# Patient Record
Sex: FEMALE | Race: WHITE | Employment: STUDENT | URBAN - METROPOLITAN AREA
[De-identification: names, ages, dates, MRNs, and addresses within clinical notes are randomized per-mention and may not be internally consistent; named-entity substitution may affect disease eponyms.]

---

## 2023-05-18 ENCOUNTER — HOSPITAL ENCOUNTER (EMERGENCY)
Age: 43
Discharge: HOME | End: 2023-05-19
Attending: EMERGENCY MEDICINE

## 2023-05-18 ENCOUNTER — APPOINTMENT (OUTPATIENT)
Dept: CT IMAGING | Age: 43
End: 2023-05-18
Attending: EMERGENCY MEDICINE

## 2023-05-18 ENCOUNTER — APPOINTMENT (OUTPATIENT)
Dept: GENERAL RADIOLOGY | Age: 43
End: 2023-05-18
Attending: EMERGENCY MEDICINE

## 2023-05-18 DIAGNOSIS — R10.9 ABDOMINAL PAIN, RIGHT LATERAL: ICD-10-CM

## 2023-05-18 DIAGNOSIS — N39.0 URINARY TRACT INFECTION WITHOUT HEMATURIA, SITE UNSPECIFIED: Primary | ICD-10-CM

## 2023-05-18 LAB
ALBUMIN SERPL-MCNC: 4.5 G/DL (ref 3.4–5)
ALBUMIN/GLOB SERPL: 1.3 {RATIO} (ref 1–2)
ALP LIVER SERPL-CCNC: 129 U/L
ALT SERPL-CCNC: 42 U/L
ANION GAP SERPL CALC-SCNC: 6 MMOL/L (ref 0–18)
AST SERPL-CCNC: 27 U/L (ref 15–37)
B-HCG UR QL: NEGATIVE
BASOPHILS # BLD AUTO: 0.04 X10(3) UL (ref 0–0.2)
BASOPHILS NFR BLD AUTO: 0.4 %
BILIRUB SERPL-MCNC: 0.4 MG/DL (ref 0.1–2)
BILIRUB UR QL STRIP.AUTO: NEGATIVE
BUN BLD-MCNC: 17 MG/DL (ref 7–18)
BUN BLD-MCNC: 17 MG/DL (ref 7–18)
CALCIUM BLD-MCNC: 10.6 MG/DL (ref 8.5–10.1)
CHLORIDE BLD-SCNC: 105 MMOL/L (ref 98–112)
CHLORIDE SERPL-SCNC: 106 MMOL/L (ref 98–112)
CLARITY UR REFRACT.AUTO: CLEAR
CO2 BLD-SCNC: 28 MMOL/L (ref 21–32)
CO2 SERPL-SCNC: 25 MMOL/L (ref 21–32)
COLOR UR AUTO: YELLOW
CREAT BLD-MCNC: 1.2 MG/DL
CREAT BLD-MCNC: 1.3 MG/DL
EOSINOPHIL # BLD AUTO: 0.1 X10(3) UL (ref 0–0.7)
EOSINOPHIL NFR BLD AUTO: 1 %
ERYTHROCYTE [DISTWIDTH] IN BLOOD BY AUTOMATED COUNT: 13.4 %
GFR SERPLBLD BASED ON 1.73 SQ M-ARVRAT: 52 ML/MIN/1.73M2 (ref 60–?)
GFR SERPLBLD BASED ON 1.73 SQ M-ARVRAT: 58 ML/MIN/1.73M2 (ref 60–?)
GLOBULIN PLAS-MCNC: 3.5 G/DL (ref 2.8–4.4)
GLUCOSE BLD-MCNC: 96 MG/DL (ref 70–99)
GLUCOSE BLD-MCNC: 96 MG/DL (ref 70–99)
GLUCOSE UR STRIP.AUTO-MCNC: NEGATIVE MG/DL
HCT VFR BLD AUTO: 43.5 %
HCT VFR BLD CALC: 43 %
HGB BLD-MCNC: 14.5 G/DL
IMM GRANULOCYTES # BLD AUTO: 0.07 X10(3) UL (ref 0–1)
IMM GRANULOCYTES NFR BLD: 0.7 %
ISTAT IONIZED CALCIUM FOR CHEM 8: 1.36 MMOL/L (ref 1.12–1.32)
KETONES UR STRIP.AUTO-MCNC: NEGATIVE MG/DL
LIPASE SERPL-CCNC: 32 U/L (ref 13–75)
LYMPHOCYTES # BLD AUTO: 4.05 X10(3) UL (ref 1–4)
LYMPHOCYTES NFR BLD AUTO: 40.1 %
MCH RBC QN AUTO: 28.7 PG (ref 26–34)
MCHC RBC AUTO-ENTMCNC: 33.3 G/DL (ref 31–37)
MCV RBC AUTO: 86.1 FL
MONOCYTES # BLD AUTO: 0.81 X10(3) UL (ref 0.1–1)
MONOCYTES NFR BLD AUTO: 8 %
NEUTROPHILS # BLD AUTO: 5.04 X10 (3) UL (ref 1.5–7.7)
NEUTROPHILS # BLD AUTO: 5.04 X10(3) UL (ref 1.5–7.7)
NEUTROPHILS NFR BLD AUTO: 49.8 %
NITRITE UR QL STRIP.AUTO: NEGATIVE
OSMOLALITY SERPL CALC.SUM OF ELEC: 285 MOSM/KG (ref 275–295)
PH UR STRIP.AUTO: 7 [PH] (ref 5–8)
PLATELET # BLD AUTO: 349 10(3)UL (ref 150–450)
POTASSIUM BLD-SCNC: 3.9 MMOL/L (ref 3.6–5.1)
POTASSIUM SERPL-SCNC: 3.7 MMOL/L (ref 3.5–5.1)
PROT SERPL-MCNC: 8 G/DL (ref 6.4–8.2)
PROT UR STRIP.AUTO-MCNC: NEGATIVE MG/DL
RBC # BLD AUTO: 5.05 X10(6)UL
SODIUM BLD-SCNC: 141 MMOL/L (ref 136–145)
SODIUM SERPL-SCNC: 137 MMOL/L (ref 136–145)
SP GR UR STRIP.AUTO: 1.01 (ref 1–1.03)
TROPONIN I HIGH SENSITIVITY: 6 NG/L
TROPONIN I HIGH SENSITIVITY: 7 NG/L
UROBILINOGEN UR STRIP.AUTO-MCNC: 0.2 MG/DL
WBC # BLD AUTO: 10.1 X10(3) UL (ref 4–11)

## 2023-05-18 PROCEDURE — 96376 TX/PRO/DX INJ SAME DRUG ADON: CPT

## 2023-05-18 PROCEDURE — 81015 MICROSCOPIC EXAM OF URINE: CPT

## 2023-05-18 PROCEDURE — 87086 URINE CULTURE/COLONY COUNT: CPT | Performed by: EMERGENCY MEDICINE

## 2023-05-18 PROCEDURE — 80047 BASIC METABLC PNL IONIZED CA: CPT

## 2023-05-18 PROCEDURE — 81001 URINALYSIS AUTO W/SCOPE: CPT

## 2023-05-18 PROCEDURE — 81025 URINE PREGNANCY TEST: CPT

## 2023-05-18 PROCEDURE — 85025 COMPLETE CBC W/AUTO DIFF WBC: CPT

## 2023-05-18 PROCEDURE — 96361 HYDRATE IV INFUSION ADD-ON: CPT

## 2023-05-18 PROCEDURE — 96365 THER/PROPH/DIAG IV INF INIT: CPT

## 2023-05-18 PROCEDURE — 71045 X-RAY EXAM CHEST 1 VIEW: CPT | Performed by: EMERGENCY MEDICINE

## 2023-05-18 PROCEDURE — 80053 COMPREHEN METABOLIC PANEL: CPT | Performed by: EMERGENCY MEDICINE

## 2023-05-18 PROCEDURE — 93010 ELECTROCARDIOGRAM REPORT: CPT

## 2023-05-18 PROCEDURE — 99285 EMERGENCY DEPT VISIT HI MDM: CPT

## 2023-05-18 PROCEDURE — 96375 TX/PRO/DX INJ NEW DRUG ADDON: CPT

## 2023-05-18 PROCEDURE — 84484 ASSAY OF TROPONIN QUANT: CPT | Performed by: EMERGENCY MEDICINE

## 2023-05-18 PROCEDURE — 74177 CT ABD & PELVIS W/CONTRAST: CPT | Performed by: EMERGENCY MEDICINE

## 2023-05-18 PROCEDURE — 87086 URINE CULTURE/COLONY COUNT: CPT

## 2023-05-18 PROCEDURE — 83690 ASSAY OF LIPASE: CPT | Performed by: EMERGENCY MEDICINE

## 2023-05-18 PROCEDURE — 93005 ELECTROCARDIOGRAM TRACING: CPT

## 2023-05-18 PROCEDURE — 81015 MICROSCOPIC EXAM OF URINE: CPT | Performed by: EMERGENCY MEDICINE

## 2023-05-18 PROCEDURE — 85025 COMPLETE CBC W/AUTO DIFF WBC: CPT | Performed by: EMERGENCY MEDICINE

## 2023-05-18 RX ORDER — MORPHINE SULFATE 4 MG/ML
4 INJECTION, SOLUTION INTRAMUSCULAR; INTRAVENOUS ONCE
Status: COMPLETED | OUTPATIENT
Start: 2023-05-18 | End: 2023-05-18

## 2023-05-18 RX ORDER — ONDANSETRON 2 MG/ML
4 INJECTION INTRAMUSCULAR; INTRAVENOUS ONCE
Status: COMPLETED | OUTPATIENT
Start: 2023-05-18 | End: 2023-05-18

## 2023-05-18 RX ORDER — ONDANSETRON 2 MG/ML
4 INJECTION INTRAMUSCULAR; INTRAVENOUS ONCE
Status: DISCONTINUED | OUTPATIENT
Start: 2023-05-18 | End: 2023-05-18

## 2023-05-19 VITALS
WEIGHT: 190 LBS | SYSTOLIC BLOOD PRESSURE: 160 MMHG | BODY MASS INDEX: 31.65 KG/M2 | TEMPERATURE: 98 F | RESPIRATION RATE: 16 BRPM | HEIGHT: 65 IN | OXYGEN SATURATION: 96 % | HEART RATE: 71 BPM | DIASTOLIC BLOOD PRESSURE: 96 MMHG

## 2023-05-19 LAB
ATRIAL RATE: 67 BPM
P AXIS: 38 DEGREES
P-R INTERVAL: 118 MS
Q-T INTERVAL: 442 MS
QRS DURATION: 82 MS
QTC CALCULATION (BEZET): 467 MS
R AXIS: 27 DEGREES
T AXIS: 56 DEGREES
VENTRICULAR RATE: 67 BPM

## 2023-05-19 PROCEDURE — 96375 TX/PRO/DX INJ NEW DRUG ADDON: CPT

## 2023-05-19 RX ORDER — CEPHALEXIN 500 MG/1
500 CAPSULE ORAL 3 TIMES DAILY
Qty: 21 CAPSULE | Refills: 0 | Status: SHIPPED | OUTPATIENT
Start: 2023-05-19 | End: 2023-05-26

## 2023-05-19 RX ORDER — ONDANSETRON 4 MG/1
4 TABLET, ORALLY DISINTEGRATING ORAL EVERY 4 HOURS PRN
Qty: 10 TABLET | Refills: 0 | Status: SHIPPED | OUTPATIENT
Start: 2023-05-19 | End: 2023-05-26

## 2023-05-19 RX ORDER — KETOROLAC TROMETHAMINE 15 MG/ML
15 INJECTION, SOLUTION INTRAMUSCULAR; INTRAVENOUS ONCE
Status: COMPLETED | OUTPATIENT
Start: 2023-05-19 | End: 2023-05-19

## 2023-05-19 NOTE — ED INITIAL ASSESSMENT (HPI)
PT to the ED for evaluation of RLQ abdominal pain for the last couple of days. RUQ abdominal pain and n/v that began today.

## 2023-05-19 NOTE — DISCHARGE INSTRUCTIONS
Please follow-up with your primary care doctor within the next week for reassessment. Please have them recheck your blood pressure at the follow-up visit. Take the medication as prescribed. Return for worsening pain, fevers or any other concerning symptoms.

## 2023-12-04 ENCOUNTER — APPOINTMENT (OUTPATIENT)
Dept: GENERAL RADIOLOGY | Age: 43
End: 2023-12-04
Attending: PHYSICIAN ASSISTANT

## 2023-12-04 ENCOUNTER — HOSPITAL ENCOUNTER (EMERGENCY)
Age: 43
Discharge: HOME | End: 2023-12-04

## 2023-12-04 VITALS
TEMPERATURE: 100 F | OXYGEN SATURATION: 96 % | HEART RATE: 99 BPM | RESPIRATION RATE: 20 BRPM | SYSTOLIC BLOOD PRESSURE: 149 MMHG | BODY MASS INDEX: 31.82 KG/M2 | WEIGHT: 191 LBS | HEIGHT: 65 IN | DIASTOLIC BLOOD PRESSURE: 87 MMHG

## 2023-12-04 DIAGNOSIS — J40 BRONCHITIS DUE TO 2019 NOVEL CORONAVIRUS: Primary | ICD-10-CM

## 2023-12-04 DIAGNOSIS — U07.1 BRONCHITIS DUE TO 2019 NOVEL CORONAVIRUS: Primary | ICD-10-CM

## 2023-12-04 PROCEDURE — 99283 EMERGENCY DEPT VISIT LOW MDM: CPT

## 2023-12-04 PROCEDURE — 99284 EMERGENCY DEPT VISIT MOD MDM: CPT

## 2023-12-04 PROCEDURE — 71046 X-RAY EXAM CHEST 2 VIEWS: CPT | Performed by: PHYSICIAN ASSISTANT

## 2023-12-04 RX ORDER — BENZONATATE 100 MG/1
100 CAPSULE ORAL 3 TIMES DAILY PRN
Qty: 30 CAPSULE | Refills: 0 | Status: SHIPPED | OUTPATIENT
Start: 2023-12-04 | End: 2024-01-03

## 2023-12-04 RX ORDER — ALBUTEROL SULFATE 90 UG/1
2 AEROSOL, METERED RESPIRATORY (INHALATION) EVERY 4 HOURS PRN
Qty: 1 EACH | Refills: 0 | Status: SHIPPED | OUTPATIENT
Start: 2023-12-04 | End: 2024-01-03

## 2023-12-04 RX ORDER — PSEUDOEPHEDRINE HCL 30 MG
30 TABLET ORAL EVERY 4 HOURS PRN
Qty: 36 TABLET | Refills: 0 | Status: SHIPPED | OUTPATIENT
Start: 2023-12-04 | End: 2024-01-03

## 2023-12-04 RX ORDER — DEXAMETHASONE 4 MG/1
8 TABLET ORAL
Qty: 6 TABLET | Refills: 0 | Status: SHIPPED | OUTPATIENT
Start: 2023-12-04 | End: 2023-12-11

## 2023-12-04 RX ORDER — IBUPROFEN 600 MG/1
600 TABLET ORAL EVERY 8 HOURS PRN
Qty: 21 TABLET | Refills: 0 | Status: SHIPPED | OUTPATIENT
Start: 2023-12-04 | End: 2023-12-11

## 2023-12-04 RX ORDER — CODEINE PHOSPHATE AND GUAIFENESIN 10; 100 MG/5ML; MG/5ML
SOLUTION ORAL
COMMUNITY
Start: 2023-12-01

## 2023-12-04 RX ORDER — AMLODIPINE BESYLATE 5 MG/1
TABLET ORAL AS DIRECTED
COMMUNITY

## 2023-12-04 RX ORDER — OXYMETAZOLINE HYDROCHLORIDE 0.05 G/100ML
1 SPRAY NASAL EVERY 4 HOURS PRN
Qty: 15 ML | Refills: 0 | Status: SHIPPED | OUTPATIENT
Start: 2023-12-04 | End: 2024-01-03

## 2023-12-04 NOTE — ED INITIAL ASSESSMENT (HPI)
Patient presents with sinus congestion, sore throat, fevers, and headache.    Patient reports she is covid+ and SARS+

## 2024-03-13 ENCOUNTER — APPOINTMENT (OUTPATIENT)
Dept: CT IMAGING | Age: 44
End: 2024-03-13
Attending: EMERGENCY MEDICINE

## 2024-03-13 ENCOUNTER — HOSPITAL ENCOUNTER (EMERGENCY)
Age: 44
Discharge: HOME | End: 2024-03-13
Attending: EMERGENCY MEDICINE

## 2024-03-13 VITALS
TEMPERATURE: 98 F | RESPIRATION RATE: 18 BRPM | DIASTOLIC BLOOD PRESSURE: 99 MMHG | WEIGHT: 180 LBS | SYSTOLIC BLOOD PRESSURE: 132 MMHG | HEART RATE: 78 BPM | BODY MASS INDEX: 30.73 KG/M2 | HEIGHT: 64 IN | OXYGEN SATURATION: 96 %

## 2024-03-13 DIAGNOSIS — K59.00 CONSTIPATION, UNSPECIFIED CONSTIPATION TYPE: ICD-10-CM

## 2024-03-13 DIAGNOSIS — R31.9 URINARY TRACT INFECTION WITH HEMATURIA, SITE UNSPECIFIED: Primary | ICD-10-CM

## 2024-03-13 DIAGNOSIS — R74.8 ELEVATED LIVER ENZYMES: ICD-10-CM

## 2024-03-13 DIAGNOSIS — N39.0 URINARY TRACT INFECTION WITH HEMATURIA, SITE UNSPECIFIED: Primary | ICD-10-CM

## 2024-03-13 LAB
ALBUMIN SERPL-MCNC: 4.4 G/DL (ref 3.4–5)
ALBUMIN/GLOB SERPL: 1.1 {RATIO} (ref 1–2)
ALP LIVER SERPL-CCNC: 176 U/L
ALT SERPL-CCNC: 186 U/L
ANION GAP SERPL CALC-SCNC: 6 MMOL/L (ref 0–18)
AST SERPL-CCNC: 81 U/L (ref 15–37)
B-HCG UR QL: NEGATIVE
BASOPHILS # BLD AUTO: 0.07 X10(3) UL (ref 0–0.2)
BASOPHILS NFR BLD AUTO: 0.8 %
BILIRUB SERPL-MCNC: 0.4 MG/DL (ref 0.1–2)
BUN BLD-MCNC: 14 MG/DL (ref 9–23)
CALCIUM BLD-MCNC: 9 MG/DL (ref 8.5–10.1)
CHLORIDE SERPL-SCNC: 109 MMOL/L (ref 98–112)
CLARITY UR REFRACT.AUTO: CLEAR
CO2 SERPL-SCNC: 23 MMOL/L (ref 21–32)
COLOR UR AUTO: YELLOW
CREAT BLD-MCNC: 1.15 MG/DL
EGFRCR SERPLBLD CKD-EPI 2021: 60 ML/MIN/1.73M2 (ref 60–?)
EOSINOPHIL # BLD AUTO: 0.13 X10(3) UL (ref 0–0.7)
EOSINOPHIL NFR BLD AUTO: 1.4 %
ERYTHROCYTE [DISTWIDTH] IN BLOOD BY AUTOMATED COUNT: 13.5 %
GLOBULIN PLAS-MCNC: 4.1 G/DL (ref 2.8–4.4)
GLUCOSE BLD-MCNC: 105 MG/DL (ref 70–99)
GLUCOSE UR STRIP.AUTO-MCNC: NEGATIVE MG/DL
HCT VFR BLD AUTO: 45.2 %
HGB BLD-MCNC: 15.1 G/DL
HYALINE CASTS #/AREA URNS AUTO: PRESENT /LPF
IMM GRANULOCYTES # BLD AUTO: 0.04 X10(3) UL (ref 0–1)
IMM GRANULOCYTES NFR BLD: 0.4 %
KETONES UR STRIP.AUTO-MCNC: NEGATIVE MG/DL
LYMPHOCYTES # BLD AUTO: 3.28 X10(3) UL (ref 1–4)
LYMPHOCYTES NFR BLD AUTO: 35.7 %
MCH RBC QN AUTO: 27.5 PG (ref 26–34)
MCHC RBC AUTO-ENTMCNC: 33.4 G/DL (ref 31–37)
MCV RBC AUTO: 82.3 FL
MONOCYTES # BLD AUTO: 0.53 X10(3) UL (ref 0.1–1)
MONOCYTES NFR BLD AUTO: 5.8 %
NEUTROPHILS # BLD AUTO: 5.15 X10 (3) UL (ref 1.5–7.7)
NEUTROPHILS # BLD AUTO: 5.15 X10(3) UL (ref 1.5–7.7)
NEUTROPHILS NFR BLD AUTO: 55.9 %
NITRITE UR QL STRIP.AUTO: NEGATIVE
OSMOLALITY SERPL CALC.SUM OF ELEC: 287 MOSM/KG (ref 275–295)
PH UR STRIP.AUTO: 6 [PH] (ref 5–8)
PLATELET # BLD AUTO: 410 10(3)UL (ref 150–450)
POTASSIUM SERPL-SCNC: 3.5 MMOL/L (ref 3.5–5.1)
PROT SERPL-MCNC: 8.5 G/DL (ref 6.4–8.2)
RBC # BLD AUTO: 5.49 X10(6)UL
RBC UR QL AUTO: NEGATIVE
SODIUM SERPL-SCNC: 138 MMOL/L (ref 136–145)
SP GR UR STRIP.AUTO: 1.02 (ref 1–1.03)
UROBILINOGEN UR STRIP.AUTO-MCNC: 0.2 MG/DL
WBC # BLD AUTO: 9.2 X10(3) UL (ref 4–11)

## 2024-03-13 PROCEDURE — 87086 URINE CULTURE/COLONY COUNT: CPT | Performed by: EMERGENCY MEDICINE

## 2024-03-13 PROCEDURE — 81015 MICROSCOPIC EXAM OF URINE: CPT | Performed by: EMERGENCY MEDICINE

## 2024-03-13 PROCEDURE — 80053 COMPREHEN METABOLIC PANEL: CPT | Performed by: EMERGENCY MEDICINE

## 2024-03-13 PROCEDURE — 96375 TX/PRO/DX INJ NEW DRUG ADDON: CPT

## 2024-03-13 PROCEDURE — 81001 URINALYSIS AUTO W/SCOPE: CPT | Performed by: EMERGENCY MEDICINE

## 2024-03-13 PROCEDURE — 81025 URINE PREGNANCY TEST: CPT

## 2024-03-13 PROCEDURE — 96361 HYDRATE IV INFUSION ADD-ON: CPT

## 2024-03-13 PROCEDURE — 74177 CT ABD & PELVIS W/CONTRAST: CPT | Performed by: EMERGENCY MEDICINE

## 2024-03-13 PROCEDURE — 99285 EMERGENCY DEPT VISIT HI MDM: CPT

## 2024-03-13 PROCEDURE — 96374 THER/PROPH/DIAG INJ IV PUSH: CPT

## 2024-03-13 PROCEDURE — 99284 EMERGENCY DEPT VISIT MOD MDM: CPT

## 2024-03-13 PROCEDURE — 85025 COMPLETE CBC W/AUTO DIFF WBC: CPT | Performed by: EMERGENCY MEDICINE

## 2024-03-13 RX ORDER — ONDANSETRON 2 MG/ML
4 INJECTION INTRAMUSCULAR; INTRAVENOUS ONCE
Status: COMPLETED | OUTPATIENT
Start: 2024-03-13 | End: 2024-03-13

## 2024-03-13 RX ORDER — HYDROMORPHONE HYDROCHLORIDE 1 MG/ML
0.5 INJECTION, SOLUTION INTRAMUSCULAR; INTRAVENOUS; SUBCUTANEOUS ONCE
Status: COMPLETED | OUTPATIENT
Start: 2024-03-13 | End: 2024-03-13

## 2024-03-13 RX ORDER — KETOROLAC TROMETHAMINE 15 MG/ML
15 INJECTION, SOLUTION INTRAMUSCULAR; INTRAVENOUS ONCE
Status: COMPLETED | OUTPATIENT
Start: 2024-03-13 | End: 2024-03-13

## 2024-03-13 RX ORDER — CEPHALEXIN 500 MG/1
500 CAPSULE ORAL 2 TIMES DAILY
Qty: 14 CAPSULE | Refills: 0 | Status: SHIPPED | OUTPATIENT
Start: 2024-03-13 | End: 2024-03-20

## 2024-03-14 NOTE — ED PROVIDER NOTES
Patient Seen in: [facility] In [place]      History     Chief Complaint   Patient presents with    Bleeding     Stated Complaint: rectal bleeding    Subjective:   HPI    This is a 43-year-old female who presents emergency room for evaluation of lower abdominal/suprapubic discomfort and rectal bleeding or blood in the urine.  Patient states that this morning she went to the bathroom, states she looked in the toilet and there was blood, states that she is unsure if it came from her rectum or from her urethra, states she did not move her bowels that she has been constipated.  States she does not feel she was straining  hard.  Does have a history of hemorrhoids, denies any rectal pain.  States that she does not have vaginal bleeding, reports she is status post hysterectomy.  Reports she has had urinary frequency and dysuria.  Denies back or flank pain.  States she has crampy discomfort in the lower abdomen.  Admits to nausea but not vomiting.  Denies any fevers or chills.  Denies any melena or hematochezia.    Objective:   Past Medical History:   Diagnosis Date    Anxiety     Attention deficit disorder with hyperactivity(314.01) 2012    Carnitine deficiency (HCC) 2013    Esophageal reflux     Estrogen deficiency 2013    Foot fracture 2014    2014     Generalized weakness 2014    Iron deficiency anemia 2015    Migraine, unspecified, without mention of intractable migraine without mention of status migrainosus 2012    Pain in joint, pelvic region and thigh     Personal history of methicillin resistant Staphylococcus aureus 07    Unspecified essential hypertension 2012              Past Surgical History:   Procedure Laterality Date          , 2005    HYSTERECTOMY  09    OTHER SURGICAL HISTORY  98    Gallbladder     OTHER SURGICAL HISTORY  96    Nasal septal deviation repair    T&A  96                Social History     Socioeconomic  History    Marital status: Unknown   Tobacco Use    Smoking status: Former     Passive exposure: Current   Vaping Use    Vaping Use: Never used   Substance and Sexual Activity    Alcohol use: No    Drug use: Yes     Types: Cannabis   Other Topics Concern    Caffeine Concern Yes     Comment: 0-6 oz daily    Exercise Yes     Comment: Yoga daily, cardio & Wts 7 times/week              Review of Systems    Positive for stated complaint: rectal bleeding  Other systems are as noted in HPI.  Constitutional and vital signs reviewed.      All other systems reviewed and negative except as noted above.    Physical Exam     ED Triage Vitals [03/13/24 1539]   BP (!) 141/100   Pulse 90   Resp 18   Temp 98.4 °F (36.9 °C)   Temp src    SpO2 98 %   O2 Device None (Room air)       Current:BP (!) 132/99   Pulse 78   Temp 98.4 °F (36.9 °C)   Resp 18   Ht 162.6 cm (5' 4\")   Wt 81.6 kg   SpO2 96%   BMI 30.90 kg/m²         Physical Exam    GENERAL: Patient is awake, alert, well-appearing, in no acute distress.  HEENT:  no scleral icterus.  Mucous membranes are moist  HEART: Regular rate and rhythm, no murmurs.  LUNGS: Clear to auscultation bilaterally.  No Rales, no rhonchi, no wheezing, no stridor.  ABDOMEN: Soft, nondistended, suprapubic tender, bowel sounds are present, no rebound, no rigidity, no guarding.no pulsatile masses. No CVA tenderness  Rectal exam: Performed with JAMIE Fagan as chaperone-nonthrombosed external hemorrhoids, there is no gross blood per rectum there is no stool in rectal vault.  EXTREMITIES: No peripheral edema, no calf tenderness,      ED Course     Labs Reviewed   COMP METABOLIC PANEL (14) - Abnormal; Notable for the following components:       Result Value    Glucose 105 (*)     Creatinine 1.15 (*)     AST 81 (*)      (*)     Alkaline Phosphatase 176 (*)     Total Protein 8.5 (*)     All other components within normal limits   URINALYSIS WITH CULTURE REFLEX - Abnormal; Notable for the following  components:    Bilirubin Urine Small (*)     Protein Urine 30 mg/dL (*)     Leukocyte Esterase Urine Small (*)     All other components within normal limits   UA MICROSCOPIC ONLY, URINE - Abnormal; Notable for the following components:    WBC Urine 21-50 (*)     Bacteria Urine 1+ (*)     Squamous Epi. Cells Many (*)     Hyaline Casts Present (*)     All other components within normal limits   CBC W/ DIFFERENTIAL - Abnormal; Notable for the following components:    RBC 5.49 (*)     All other components within normal limits   POCT PREGNANCY URINE - Normal   CBC WITH DIFFERENTIAL WITH PLATELET    Narrative:     The following orders were created for panel order CBC With Differential With Platelet.  Procedure                               Abnormality         Status                     ---------                               -----------         ------                     CBC W/ DIFFERENTIAL[[id number]]          Abnormal            Final result                 Please view results for these tests on the individual orders.   URINE CULTURE, ROUTINE                      MDM        Differential diagnosis before testing includes but not limited to cystitis/urinary tract infection, nephrolithiasis/urolithiasis, colitis, electrolyte abnormality, anemia, perforated viscus, bowel obstruction, which is a medical condition that poses a threat to life/function    Past Medical History/comorbidities-status post total hysterectomy    Radiographic images  I personally reviewed the radiographs and my individual interpretation shows CT abdomen no free air  I also reviewed the official reports that showed CT abdomen no acute abnormality, moderate amount of fecal material throughout colon may be seen with constipation      Medications Provided: IV normal saline, Toradol, Dilaudid    Course of Events during Emergency Room Visit include IV established blood work obtained.  Patient given IV fluid hydration and pain medication.  CBC was  unremarkable.  Chemistry sodium 130 potassium 3.5 BUN 14 creatinine 1.15 glucose 105.  AST/ALT/alk phos were elevated.  CT abdomen without acute findings.  Urinalysis consistent with UTI.  On reevaluation states she is feel much better abdomen soft nontender nonsurgical, vital signs are stable.  Discussed with patient all results, patient does have blood in the urine which appears to be the source of her symptoms, will prescribe antibiotic, does have elevated liver enzymes of unclear etiology.  Recommend follow-up with primary care as well as GI.  Return to ER if any change or symptoms.  Patient well-appearing agrees plan discharge good condition with mother    Shared decision making was utilized           Disposition:      Discharge  I have discussed with the patient the results of test, differential diagnosis, treatment plan, warning signs and symptoms which should prompt immediate return.  They expressed understanding of these instructions and agrees to the following plan provided.  They were given written discharge instructions and agrees to return for any concerns and voiced understanding and all questions were answered.    Note to patient: The 21st Century Cures Act makes medical notes like these available to patients in the interest of transparency. However, this is a medical document intended as peer to peer communication. It is written in medical language and may contain abbreviations or verbiage that are unfamiliar. It may appear blunt or direct. Medical documents are intended to carry relevant information, facts as evident, and the clinical opinion of the practitioner.                                            Medical Decision Making      Disposition and Plan     Clinical Impression:  1. Urinary tract infection with hematuria, site unspecified    2. Elevated liver enzymes    3. Constipation, unspecified constipation type         Disposition:  Discharge  3/13/2024  6:46 pm    Follow-up:  [place]  [place]  [address]  [address]  [facility] [address]  [phone]    Follow up in 2 day(s)            Medications Prescribed:  Discharge Medication List as of 3/13/2024  6:52 PM        START taking these medications    Details   cephalexin 500 MG Oral Cap Take 1 capsule (500 mg total) by mouth 2 (two) times daily for 7 days., Normal, Disp-14 capsule, R-0

## 2024-06-18 ENCOUNTER — HOSPITAL ENCOUNTER (EMERGENCY)
Age: 44
Discharge: HOME | End: 2024-06-18
Attending: EMERGENCY MEDICINE

## 2024-06-18 ENCOUNTER — APPOINTMENT (OUTPATIENT)
Dept: CT IMAGING | Age: 44
End: 2024-06-18
Attending: EMERGENCY MEDICINE

## 2024-06-18 ENCOUNTER — ORDER TRANSCRIPTION (OUTPATIENT)
Dept: EMERGENCY DEPT | Age: 44
End: 2024-06-18

## 2024-06-18 VITALS
WEIGHT: 180 LBS | HEIGHT: 64 IN | OXYGEN SATURATION: 94 % | TEMPERATURE: 99 F | RESPIRATION RATE: 18 BRPM | HEART RATE: 93 BPM | BODY MASS INDEX: 30.73 KG/M2 | DIASTOLIC BLOOD PRESSURE: 103 MMHG | SYSTOLIC BLOOD PRESSURE: 164 MMHG

## 2024-06-18 DIAGNOSIS — S09.90XA CLOSED HEAD INJURY, INITIAL ENCOUNTER: Primary | ICD-10-CM

## 2024-06-18 DIAGNOSIS — S06.0X1A CONCUSSION WITH LOSS OF CONSCIOUSNESS OF 30 MINUTES OR LESS, INITIAL ENCOUNTER: ICD-10-CM

## 2024-06-18 PROCEDURE — 70450 CT HEAD/BRAIN W/O DYE: CPT | Performed by: EMERGENCY MEDICINE

## 2024-06-18 PROCEDURE — 99284 EMERGENCY DEPT VISIT MOD MDM: CPT

## 2024-06-18 PROCEDURE — 96375 TX/PRO/DX INJ NEW DRUG ADDON: CPT

## 2024-06-18 PROCEDURE — 96374 THER/PROPH/DIAG INJ IV PUSH: CPT

## 2024-06-18 RX ORDER — DIPHENHYDRAMINE HYDROCHLORIDE 50 MG/ML
50 INJECTION INTRAMUSCULAR; INTRAVENOUS ONCE
Status: COMPLETED | OUTPATIENT
Start: 2024-06-18 | End: 2024-06-18

## 2024-06-18 RX ORDER — METOCLOPRAMIDE HYDROCHLORIDE 5 MG/ML
10 INJECTION INTRAMUSCULAR; INTRAVENOUS ONCE
Status: COMPLETED | OUTPATIENT
Start: 2024-06-18 | End: 2024-06-18

## 2024-06-18 RX ORDER — BUTALBITAL, ACETAMINOPHEN AND CAFFEINE 50; 325; 40 MG/1; MG/1; MG/1
1-2 TABLET ORAL EVERY 6 HOURS PRN
Qty: 10 TABLET | Refills: 0 | Status: SHIPPED | OUTPATIENT
Start: 2024-06-18 | End: 2024-06-23

## 2024-06-18 NOTE — ED PROVIDER NOTES
Patient Seen in: [facility] In [place]      History     Chief Complaint   Patient presents with    Head Injury     Pt states that last Thursday she fell backwards and hit her head while at work. Pt states that she never f/u afterwards. Pt states that she continues with a headache and difficulty remembering after the incident.      Stated Complaint: Pt states she fell backwards last thursday and hit her head. Pt states that she*    Subjective:   HPI    43-year-old female stated that she fell backwards after a chair slipped at work at [employer] 5 days ago and hit the back of her head.  Since that time she states she has had a persistent headache, currently 4/10 in severity, and feels somewhat confused.  She attempted to work today but left after a few hours.  She states she has had some intermittent double vision.  No nausea or vomiting, neck pain, speech difficulty, unilateral numbness or weakness.  Her mother with whom she resides states that she was confused today and did not know what day it was.  Patient states she has been taking over-the-counter analgesics without sustained symptomatic improvement.    Objective:   Past Medical History:    Anxiety    Attention deficit disorder with hyperactivity(314.01)    Carnitine deficiency (HCC)    Esophageal reflux    Estrogen deficiency    Foot fracture         Generalized weakness    Iron deficiency anemia    Migraine, unspecified, without mention of intractable migraine without mention of status migrainosus    Migraines    Pain in joint, pelvic region and thigh    Personal history of methicillin resistant Staphylococcus aureus    Unspecified essential hypertension              Past Surgical History:   Procedure Laterality Date          , 2005    Hysterectomy  2009    Other surgical history  1998    Gallbladder     Other surgical history  1996    Nasal septal deviation repair    Plastic surgery - external N/A 2023     \"mommy makeover\"    T&a  01/01/1996                Social History     Socioeconomic History    Marital status: Unknown   Tobacco Use    Smoking status: Former     Passive exposure: Current   Vaping Use    Vaping status: Some Days   Substance and Sexual Activity    Alcohol use: No    Drug use: Yes     Types: Cannabis   Other Topics Concern    Caffeine Concern Yes     Comment: 0-6 oz daily    Exercise Yes     Comment: Yoga daily, cardio & Wts 7 times/week              Review of Systems    Positive for stated complaint: Pt states she fell backwards last thursday and hit her head. Pt states that she*  Other systems are as noted in HPI.  Constitutional and vital signs reviewed.      All other systems reviewed and negative except as noted above.    Physical Exam     ED Triage Vitals [06/18/24 1721]   BP (!) 164/103   Pulse 93   Resp 18   Temp 98.6 °F (37 °C)   Temp src Oral   SpO2 94 %   O2 Device None (Room air)       Current Vitals:   Vital Signs  BP: (!) 164/103  Pulse: 93  Resp: 18  Temp: 98.6 °F (37 °C)  Temp src: Oral    Oxygen Therapy  SpO2: 94 %  O2 Device: None (Room air)            Physical Exam    General appearance: This is a middle-aged female sitting on a gurney.  Vital signs were reviewed per nurses notes.  HEENT: Normocephalic atraumatic.  Pupils equal round reactive to light.  Extraocular movements are intact.  Tympanic membranes are normal.  Nose and oropharynx are atraumatic.  Neck is nontender without adenopathy or thyromegaly.  Lungs are clear to auscultation.  Heart exam: Normal S1-S2 without extra sounds or murmurs.  Regular rate and rhythm.  Abdomen is nontender.  Extremities are atraumatic.  Skin is dry without rashes or lesions.  Neuroexam: Alert and oriented x 4.  Speech is fluent.  Motor strength is 5/5 and symmetrical in all 4 extremities.  Cerebellar exam including finger-nose and heel shin was normal.    ED Course   Labs Reviewed - No data to display          At the patient's request and  intravenous access was obtained and the patient was treated with Reglan and Benadryl for her headache.    Headache was gone upon reevaluation.  Patient states that Imitrex is no longer effective for her migraines.  Prescription for Fioricet was dispensed after discussion regarding test results and treatment plan.    CT BRAIN OR HEAD (71611)    Result Date: 6/18/2024  PROCEDURE:  CT BRAIN OR HEAD (90179)  COMPARISON:  [facility], CT, BRAIN W/O CONTRAST, 7/02/2010, 1:11 AM.  [facility], CT, BRAIN W/O CONTRAST, 3/15/2010, 8:39 PM.  INDICATIONS:  Pt states she fell backwards last thursday and hit her head. Pt states that she never f/u after the fall. Pt states that she continues with headaches and diffic  TECHNIQUE:  Noncontrast CT scanning is performed through the brain. Dose reduction techniques were used. Dose information is transmitted to the ACR (American College of Radiology) NRDR (National Radiology Data Registry) which includes the Dose Index Registry.  PATIENT STATED HISTORY: (As transcribed by Technologist)  Patient states she fell backwards last thursday and hit her head. Pt states that she never f/u after the fall. Pt states that she continues with headaches and difficulty remembering after the incident with pain to back of her head.    FINDINGS:  The ventricles are normal in size and configuration. There is no evidence of hemorrhage, mass, midline shift, or extra-axial fluid collection.  The visualized paranasal sinuses show no significant sinus disease. . No evidence of depressed skull fracture.            CONCLUSION: No acute intracranial findings.    LOCATION:  [facility]   Dictated by (CST): Cm Perla MD on 6/18/2024 at 6:26 PM     Finalized by (CST): Cm Perla MD on 6/18/2024 at 6:27 PM       I personally reviewed the images myself and went over results with patient.  I viewed the CT plane brain films myself and there is no evidence of an acute intracranial abnormality.         MDM      #1.   Closed head injury.  Post fall several days ago with no evidence of intracranial hemorrhage.  2.  Concussion secondary to #1.  Fioricet prescribed.  Taken off of work until she can [facility].                                   Medical Decision Making      Disposition and Plan     Clinical Impression:  1. Closed head injury, initial encounter    2. Concussion with loss of consciousness of 30 minutes or less, initial encounter         Disposition:  Discharge  6/18/2024  6:38 pm    Follow-up:  [facility]  [address] [place] [address] Dr [place] [address] [place] [id number] [phone]  Call in 1 day  Work related injury follow up          Medications Prescribed:  Current Discharge Medication List        START taking these medications    Details   butalbital-acetaminophen-caffeine -40 MG Oral Tab Take 1-2 tablets by mouth every 6 (six) hours as needed for Pain.  Qty: 10 tablet, Refills: 0    Associated Diagnoses: Concussion with loss of consciousness of 30 minutes or less, initial encounter

## 2024-06-18 NOTE — DISCHARGE INSTRUCTIONS
[facility] [place]  [address] [address]  Dept: [phone]  Dept Fax: [phone]     Occupational restrictions  For: Naresh Aleman  No work until follow-up with [facility] tomorrow, Wednesday, June 19, 2024    Limit electronics use including no video games, texting, TV or computer.

## 2024-06-18 NOTE — ED INITIAL ASSESSMENT (HPI)
Pt here for a fall that occurred at work last Thursday, patient went to sit in stool with arm full of supplies and fell. Pt continues to have posterior head pain and is having difficulty recalling full course of events.

## 2024-07-11 ENCOUNTER — HOSPITAL ENCOUNTER (EMERGENCY)
Age: 44
Discharge: HOME | End: 2024-07-11
Attending: EMERGENCY MEDICINE
Payer: OTHER MISCELLANEOUS

## 2024-07-11 ENCOUNTER — APPOINTMENT (OUTPATIENT)
Dept: CT IMAGING | Age: 44
End: 2024-07-11
Attending: EMERGENCY MEDICINE
Payer: OTHER MISCELLANEOUS

## 2024-07-11 VITALS
HEART RATE: 70 BPM | RESPIRATION RATE: 16 BRPM | BODY MASS INDEX: 29.02 KG/M2 | WEIGHT: 170 LBS | DIASTOLIC BLOOD PRESSURE: 76 MMHG | SYSTOLIC BLOOD PRESSURE: 135 MMHG | HEIGHT: 64 IN | OXYGEN SATURATION: 97 % | TEMPERATURE: 98 F

## 2024-07-11 DIAGNOSIS — K59.00 CONSTIPATION, UNSPECIFIED CONSTIPATION TYPE: ICD-10-CM

## 2024-07-11 DIAGNOSIS — K92.2 LOWER GI BLEEDING: Primary | ICD-10-CM

## 2024-07-11 LAB
ALBUMIN SERPL-MCNC: 4.1 G/DL (ref 3.4–5)
ALBUMIN/GLOB SERPL: 1.2 {RATIO} (ref 1–2)
ALP LIVER SERPL-CCNC: 113 U/L
ALT SERPL-CCNC: 58 U/L
ANION GAP SERPL CALC-SCNC: 7 MMOL/L (ref 0–18)
AST SERPL-CCNC: 21 U/L (ref 15–37)
BASOPHILS # BLD AUTO: 0.06 X10(3) UL (ref 0–0.2)
BASOPHILS NFR BLD AUTO: 0.6 %
BILIRUB SERPL-MCNC: 0.2 MG/DL (ref 0.1–2)
BILIRUB UR QL STRIP.AUTO: NEGATIVE
BUN BLD-MCNC: 17 MG/DL (ref 9–23)
CALCIUM BLD-MCNC: 9.4 MG/DL (ref 8.5–10.1)
CHLORIDE SERPL-SCNC: 105 MMOL/L (ref 98–112)
CLARITY UR REFRACT.AUTO: CLEAR
CO2 SERPL-SCNC: 24 MMOL/L (ref 21–32)
COLOR UR AUTO: YELLOW
CREAT BLD-MCNC: 0.9 MG/DL
EGFRCR SERPLBLD CKD-EPI 2021: 81 ML/MIN/1.73M2 (ref 60–?)
EOSINOPHIL # BLD AUTO: 0.11 X10(3) UL (ref 0–0.7)
EOSINOPHIL NFR BLD AUTO: 1 %
ERYTHROCYTE [DISTWIDTH] IN BLOOD BY AUTOMATED COUNT: 13.5 %
GLOBULIN PLAS-MCNC: 3.3 G/DL (ref 2.8–4.4)
GLUCOSE BLD-MCNC: 128 MG/DL (ref 70–99)
GLUCOSE UR STRIP.AUTO-MCNC: NEGATIVE MG/DL
HCT VFR BLD AUTO: 35.7 %
HGB BLD-MCNC: 12 G/DL
IMM GRANULOCYTES # BLD AUTO: 0.03 X10(3) UL (ref 0–1)
IMM GRANULOCYTES NFR BLD: 0.3 %
KETONES UR STRIP.AUTO-MCNC: NEGATIVE MG/DL
LIPASE SERPL-CCNC: 22 U/L (ref 13–75)
LYMPHOCYTES # BLD AUTO: 3.06 X10(3) UL (ref 1–4)
LYMPHOCYTES NFR BLD AUTO: 28.8 %
MCH RBC QN AUTO: 28 PG (ref 26–34)
MCHC RBC AUTO-ENTMCNC: 33.6 G/DL (ref 31–37)
MCV RBC AUTO: 83.4 FL
MONOCYTES # BLD AUTO: 0.8 X10(3) UL (ref 0.1–1)
MONOCYTES NFR BLD AUTO: 7.5 %
NEUTROPHILS # BLD AUTO: 6.58 X10 (3) UL (ref 1.5–7.7)
NEUTROPHILS # BLD AUTO: 6.58 X10(3) UL (ref 1.5–7.7)
NEUTROPHILS NFR BLD AUTO: 61.8 %
NITRITE UR QL STRIP.AUTO: NEGATIVE
OSMOLALITY SERPL CALC.SUM OF ELEC: 285 MOSM/KG (ref 275–295)
PH UR STRIP.AUTO: 5.5 [PH] (ref 5–8)
PLATELET # BLD AUTO: 306 10(3)UL (ref 150–450)
POTASSIUM SERPL-SCNC: 3.2 MMOL/L (ref 3.5–5.1)
PROT SERPL-MCNC: 7.4 G/DL (ref 6.4–8.2)
PROT UR STRIP.AUTO-MCNC: NEGATIVE MG/DL
RBC # BLD AUTO: 4.28 X10(6)UL
RBC UR QL AUTO: NEGATIVE
SODIUM SERPL-SCNC: 136 MMOL/L (ref 136–145)
SP GR UR STRIP.AUTO: 1.02 (ref 1–1.03)
UROBILINOGEN UR STRIP.AUTO-MCNC: 0.2 MG/DL
WBC # BLD AUTO: 10.6 X10(3) UL (ref 4–11)

## 2024-07-11 PROCEDURE — 96374 THER/PROPH/DIAG INJ IV PUSH: CPT

## 2024-07-11 PROCEDURE — 96361 HYDRATE IV INFUSION ADD-ON: CPT

## 2024-07-11 PROCEDURE — 80053 COMPREHEN METABOLIC PANEL: CPT

## 2024-07-11 PROCEDURE — 96375 TX/PRO/DX INJ NEW DRUG ADDON: CPT

## 2024-07-11 PROCEDURE — 83690 ASSAY OF LIPASE: CPT

## 2024-07-11 PROCEDURE — 99284 EMERGENCY DEPT VISIT MOD MDM: CPT

## 2024-07-11 PROCEDURE — 81015 MICROSCOPIC EXAM OF URINE: CPT | Performed by: EMERGENCY MEDICINE

## 2024-07-11 PROCEDURE — 87086 URINE CULTURE/COLONY COUNT: CPT | Performed by: EMERGENCY MEDICINE

## 2024-07-11 PROCEDURE — 83690 ASSAY OF LIPASE: CPT | Performed by: EMERGENCY MEDICINE

## 2024-07-11 PROCEDURE — 85025 COMPLETE CBC W/AUTO DIFF WBC: CPT | Performed by: EMERGENCY MEDICINE

## 2024-07-11 PROCEDURE — 74177 CT ABD & PELVIS W/CONTRAST: CPT | Performed by: EMERGENCY MEDICINE

## 2024-07-11 PROCEDURE — 80053 COMPREHEN METABOLIC PANEL: CPT | Performed by: EMERGENCY MEDICINE

## 2024-07-11 PROCEDURE — 99285 EMERGENCY DEPT VISIT HI MDM: CPT

## 2024-07-11 PROCEDURE — 85025 COMPLETE CBC W/AUTO DIFF WBC: CPT

## 2024-07-11 PROCEDURE — 81001 URINALYSIS AUTO W/SCOPE: CPT | Performed by: EMERGENCY MEDICINE

## 2024-07-11 RX ORDER — HYDROMORPHONE HYDROCHLORIDE 1 MG/ML
1 INJECTION, SOLUTION INTRAMUSCULAR; INTRAVENOUS; SUBCUTANEOUS ONCE
Status: COMPLETED | OUTPATIENT
Start: 2024-07-11 | End: 2024-07-11

## 2024-07-11 RX ORDER — ONDANSETRON 2 MG/ML
4 INJECTION INTRAMUSCULAR; INTRAVENOUS ONCE
Status: COMPLETED | OUTPATIENT
Start: 2024-07-11 | End: 2024-07-11

## 2024-07-11 RX ORDER — METOCLOPRAMIDE HYDROCHLORIDE 5 MG/ML
10 INJECTION INTRAMUSCULAR; INTRAVENOUS ONCE
Status: COMPLETED | OUTPATIENT
Start: 2024-07-11 | End: 2024-07-11

## 2024-07-11 RX ORDER — DIPHENHYDRAMINE HCL 25 MG
25 CAPSULE ORAL NIGHTLY PRN
COMMUNITY

## 2024-07-11 RX ORDER — POTASSIUM CHLORIDE 20 MEQ/1
40 TABLET, EXTENDED RELEASE ORAL ONCE
Status: COMPLETED | OUTPATIENT
Start: 2024-07-11 | End: 2024-07-11

## 2024-07-11 RX ORDER — DIPHENHYDRAMINE HYDROCHLORIDE 50 MG/ML
25 INJECTION INTRAMUSCULAR; INTRAVENOUS ONCE
Status: COMPLETED | OUTPATIENT
Start: 2024-07-11 | End: 2024-07-11

## 2024-07-11 NOTE — DISCHARGE INSTRUCTIONS
Call your gastroenterologist to review results of previous colonoscopy.  You may need another colonoscopy if bleeding persists.  Treat constipation with daily Colace and MiraLAX together.  Follow-up with your PCP as well next week

## 2024-07-11 NOTE — ED INITIAL ASSESSMENT (HPI)
Pt to ED with generalized abdominal pain, nauseated, blood in stool 3-4 per day for the last 2 weeks. States feels tired and dizzy. No blood thinners. Reports fevers at home.

## 2024-07-11 NOTE — ED PROVIDER NOTES
Patient Seen in: [facility] In [place]      History     Chief Complaint   Patient presents with    Abdomen/Flank Pain    GI Bleeding     Stated Complaint: pt to ED with blood in stool for a few weeks    Subjective:   HPI    43-year-old female presents for evaluation of abdominal pain and bloody stools for the past 2 weeks.  Patient has had diffuse abdominal pain with constipation for the past 2 weeks.  She later developed bleeding when having a bowel movement.  Initially she was constipated but now has normal stools but continues to have blood with bowel movement as well.  Has had a colonoscopy in the past but does not remember if she has been diagnosed with diverticulosis or internal hemorrhoids.  Not on any blood thinners.  No nausea or vomiting.    Objective:   Past Medical History:    Anxiety    Attention deficit disorder with hyperactivity(314.01)    Carnitine deficiency (HCC)    Esophageal reflux    Estrogen deficiency    Foot fracture         Generalized weakness    Iron deficiency anemia    Migraine, unspecified, without mention of intractable migraine without mention of status migrainosus    Migraines    Pain in joint, pelvic region and thigh    Personal history of methicillin resistant Staphylococcus aureus    Unspecified essential hypertension              Past Surgical History:   Procedure Laterality Date          ,     Hysterectomy  2009    Other surgical history  1998    Gallbladder     Other surgical history  1996    Nasal septal deviation repair    Plastic surgery - external N/A 2023    \"mommy makeover\"    T&a  1996                Social History     Socioeconomic History    Marital status: Unknown   Tobacco Use    Smoking status: Former     Passive exposure: Current    Smokeless tobacco: Never   Vaping Use    Vaping status: Some Days   Substance and Sexual Activity    Alcohol use: No    Drug use: Yes     Types: Cannabis   Other  Topics Concern    Caffeine Concern Yes     Comment: 0-6 oz daily    Exercise Yes     Comment: Yoga daily, cardio & Wts 7 times/week              Review of Systems    Positive for stated Chief Complaint: Abdomen/Flank Pain and GI Bleeding    Other systems are as noted in HPI.  Constitutional and vital signs reviewed.      All other systems reviewed and negative except as noted above.    Physical Exam     ED Triage Vitals [07/11/24 0530]   BP (!) 172/94   Pulse 108   Resp 22   Temp 98.2 °F (36.8 °C)   Temp src Oral   SpO2 98 %   O2 Device None (Room air)       Current Vitals:   Vital Signs  BP: (!) 172/94  Pulse: 108  Resp: 22  Temp: 98.2 °F (36.8 °C)  Temp src: Oral    Oxygen Therapy  SpO2: 98 %  O2 Device: None (Room air)            Physical Exam    General: Alert, oriented, no apparent distress  HEENT:  Pupils equal reactive.  Extraocular motions intact. MMM.  Neck: Supple  Lungs: Clear to auscultation bilaterally.  Heart: Regular rate and rhythm.  Abdomen: Soft, nontender.   Skin: No rash.  No edema.  Neurologic: No focal neurologic deficits.  Normal speech pattern  Musculoskeletal: No tenderness or deformity noted.  Full range of motion throughout.        ED Course     Labs Reviewed   COMP METABOLIC PANEL (14) - Abnormal; Notable for the following components:       Result Value    Glucose 128 (*)     Potassium 3.2 (*)     ALT 58 (*)     Alkaline Phosphatase 113 (*)     All other components within normal limits   URINALYSIS WITH CULTURE REFLEX - Abnormal; Notable for the following components:    Leukocyte Esterase Urine Trace (*)     All other components within normal limits   UA MICROSCOPIC ONLY, URINE - Abnormal; Notable for the following components:    WBC Urine 6-10 (*)     Bacteria Urine 1+ (*)     Squamous Epi. Cells Few (*)     All other components within normal limits   LIPASE - Normal   CBC WITH DIFFERENTIAL WITH PLATELET    Narrative:     The following orders were created for panel order CBC With  Differential With Platelet.  Procedure                               Abnormality         Status                     ---------                               -----------         ------                     CBC W/ DIFFERENTIAL[[id number]]                              Final result                 Please view results for these tests on the individual orders.   URINE CULTURE, ROUTINE   CBC W/ DIFFERENTIAL                      MDM      Differential diagnosis includes internal hemorrhoidal bleeding, diverticular bleeding, diverticulitis, constipation      Chemistry with slightly low potassium 3.2.  Liver function test slightly elevated but similar to previous.    Lipase is normal.    CBC is normal.  Hemoglobin 12.0      CT ABDOMEN+PELVIS(CONTRAST ONLY)(CPT=74177)    Result Date: 7/11/2024  PROCEDURE:  CT ABDOMEN+PELVIS (CONTRAST ONLY) (CPT=74177)  COMPARISON:  [facility], CT, CT ABDOMEN+PELVIS(CONTRAST ONLY)(CPT=74177), 3/13/2024, 5:35 PM.  INDICATIONS:  Nausea and bloody stools for 2 weeks.  TECHNIQUE:  CT scanning was performed from the dome of the diaphragm to the pubic symphysis with non-ionic intravenous contrast material. Post contrast coronal MPR imaging was performed.  Dose reduction techniques were used. Dose information is transmitted to the ACR (American College of Radiology) NRDR (National Radiology Data Registry) which includes the Dose Index Registry.  PATIENT STATED HISTORY:(As transcribed by Technologist)  Patient has had generalized abdominal pain, nausea and blood in stool for 2 weeks.   CONTRAST USED:  100cc of Isovue 370  FINDINGS:  LIVER:  No enlargement, atrophy, abnormal density, or significant focal lesion.  BILIARY:  No visible dilatation or calcification.  Patient is status post cholecystectomy. PANCREAS:  No lesion, fluid collection, ductal dilatation, or atrophy.  SPLEEN:  No enlargement or focal lesion.  KIDNEYS:  No mass, obstruction, or calcification.  ADRENALS:  No mass or enlargement.   AORTA/VASCULAR:  No aneurysm or dissection.  RETROPERITONEUM:  No mass or adenopathy.  BOWEL/MESENTERY:  No visible mass, obstruction, or bowel wall thickening.  The appendix is normal.  Moderate colonic stool burden, similar to previous imaging.  No free intraperitoneal air, fluid or inflammatory changes of the peritoneal cavity. ABDOMINAL WALL:  No mass or hernia.  URINARY BLADDER:  No visible focal wall thickening, lesion, or calculus.  PELVIC NODES:  No adenopathy.  PELVIC ORGANS:  No visible mass.  Pelvic organs appropriate for patient age.  BONES:  No bony lesion or fracture.  Extensive lumbar disc disease at L4-5 and L5-S1 with vacuum disc changes noted. LUNG BASES:  No visible pulmonary or pleural disease.  OTHER:  Negative.             CONCLUSION:  1. No acute intra-abdominal or pelvic process noted. 2. Moderate colonic stool burden, similar in appearance to previous imaging.  No discrete evidence to suggest regions of abnormal colonic wall thickening or colonic lesions. 3. Please see further details above.   LOCATION:  [facility]   Dictated by (CST): Andreia Rehman DO on 7/11/2024 at 8:24 AM     Finalized by (CST): Andreia Rehman DO on 7/11/2024 at 8:35 AM              Medications   ondansetron (Zofran) 4 MG/2ML injection 4 mg (4 mg Intravenous Given 7/11/24 0548)   HYDROmorphone (Dilaudid) 1 MG/ML injection 1 mg (1 mg Intravenous Given 7/11/24 0700)   metoclopramide (Reglan) 5 mg/mL injection 10 mg (10 mg Intravenous Given 7/11/24 0700)   diphenhydrAMINE (Benadryl) 50 mg/mL  injection 25 mg (25 mg Intravenous Given 7/11/24 0700)   sodium chloride 0.9 % IV bolus 1,000 mL (1,000 mL Intravenous New Bag 7/11/24 0700)   potassium chloride (Klor-Con M20) tab 40 mEq (40 mEq Oral Given 7/11/24 0700)   iopamidol 76% (ISOVUE-370) injection for power injector (100 mL Intravenous Given 7/11/24 0810)            Most likely GI bleeding related to constipation and internal hemorrhoids.  Hemoglobin is stable.  No bleeding  while observed here     Patient feeling better after medicated.  She will follow-up with [facility] who she may have had a remote colonoscopy with to decide whether or not another colonoscopy would be indicated         Medical Decision Making      Disposition and Plan     Clinical Impression:  1. Lower GI bleeding    2. Constipation, unspecified constipation type         Disposition:  Discharge  7/11/2024  9:09 am    Follow-up:  Lui Mckeon MD  [address]  [address]  [facility] [address] [phone]    Call in 3 day(s)      Martina Alejandro,   [address] [address], [facility]  [facility] [id number] [phone]    Schedule an appointment as soon as possible for a visit            Medications Prescribed:  Current Discharge Medication List

## 2024-08-30 ENCOUNTER — HOSPITAL ENCOUNTER (EMERGENCY)
Age: 44
Discharge: HOME | End: 2024-08-30
Attending: EMERGENCY MEDICINE
Payer: MEDICAID

## 2024-08-30 VITALS
BODY MASS INDEX: 29 KG/M2 | DIASTOLIC BLOOD PRESSURE: 75 MMHG | TEMPERATURE: 99 F | HEART RATE: 78 BPM | OXYGEN SATURATION: 99 % | RESPIRATION RATE: 19 BRPM | WEIGHT: 171 LBS | SYSTOLIC BLOOD PRESSURE: 111 MMHG

## 2024-08-30 DIAGNOSIS — G43.801 OTHER MIGRAINE WITH STATUS MIGRAINOSUS, NOT INTRACTABLE: Primary | ICD-10-CM

## 2024-08-30 DIAGNOSIS — R31.9 HEMATURIA, UNSPECIFIED TYPE: ICD-10-CM

## 2024-08-30 DIAGNOSIS — K92.2 GASTROINTESTINAL HEMORRHAGE, UNSPECIFIED GASTROINTESTINAL HEMORRHAGE TYPE: ICD-10-CM

## 2024-08-30 LAB
ALBUMIN SERPL-MCNC: 4.3 G/DL (ref 3.4–5)
ALBUMIN/GLOB SERPL: 1.3 {RATIO} (ref 1–2)
ALP LIVER SERPL-CCNC: 121 U/L
ALT SERPL-CCNC: 33 U/L
ANION GAP SERPL CALC-SCNC: 7 MMOL/L (ref 0–18)
AST SERPL-CCNC: 18 U/L (ref 15–37)
BASOPHILS # BLD AUTO: 0.07 X10(3) UL (ref 0–0.2)
BASOPHILS NFR BLD AUTO: 0.8 %
BILIRUB SERPL-MCNC: 0.3 MG/DL (ref 0.1–2)
BUN BLD-MCNC: 14 MG/DL (ref 9–23)
CALCIUM BLD-MCNC: 9.3 MG/DL (ref 8.5–10.1)
CHLORIDE SERPL-SCNC: 105 MMOL/L (ref 98–112)
CO2 SERPL-SCNC: 25 MMOL/L (ref 21–32)
CREAT BLD-MCNC: 1.15 MG/DL
EGFRCR SERPLBLD CKD-EPI 2021: 60 ML/MIN/1.73M2 (ref 60–?)
EOSINOPHIL # BLD AUTO: 0.16 X10(3) UL (ref 0–0.7)
EOSINOPHIL NFR BLD AUTO: 1.9 %
ERYTHROCYTE [DISTWIDTH] IN BLOOD BY AUTOMATED COUNT: 12.8 %
GLOBULIN PLAS-MCNC: 3.2 G/DL (ref 2.8–4.4)
GLUCOSE BLD-MCNC: 113 MG/DL (ref 70–99)
HCT VFR BLD AUTO: 37.6 %
HGB BLD-MCNC: 12.4 G/DL
IMM GRANULOCYTES # BLD AUTO: 0.02 X10(3) UL (ref 0–1)
IMM GRANULOCYTES NFR BLD: 0.2 %
INR BLD: 0.93 (ref 0.8–1.2)
LYMPHOCYTES # BLD AUTO: 3.46 X10(3) UL (ref 1–4)
LYMPHOCYTES NFR BLD AUTO: 41.7 %
MCH RBC QN AUTO: 27.6 PG (ref 26–34)
MCHC RBC AUTO-ENTMCNC: 33 G/DL (ref 31–37)
MCV RBC AUTO: 83.6 FL
MONOCYTES # BLD AUTO: 0.72 X10(3) UL (ref 0.1–1)
MONOCYTES NFR BLD AUTO: 8.7 %
NEUTROPHILS # BLD AUTO: 3.86 X10 (3) UL (ref 1.5–7.7)
NEUTROPHILS # BLD AUTO: 3.86 X10(3) UL (ref 1.5–7.7)
NEUTROPHILS NFR BLD AUTO: 46.7 %
OSMOLALITY SERPL CALC.SUM OF ELEC: 285 MOSM/KG (ref 275–295)
PLATELET # BLD AUTO: 347 10(3)UL (ref 150–450)
POTASSIUM SERPL-SCNC: 3.6 MMOL/L (ref 3.5–5.1)
PROT SERPL-MCNC: 7.5 G/DL (ref 6.4–8.2)
PROTHROMBIN TIME: 12.4 SECONDS (ref 11.6–14.8)
RBC # BLD AUTO: 4.5 X10(6)UL
SODIUM SERPL-SCNC: 137 MMOL/L (ref 136–145)
WBC # BLD AUTO: 8.3 X10(3) UL (ref 4–11)

## 2024-08-30 PROCEDURE — 99284 EMERGENCY DEPT VISIT MOD MDM: CPT

## 2024-08-30 PROCEDURE — 96374 THER/PROPH/DIAG INJ IV PUSH: CPT

## 2024-08-30 PROCEDURE — 85610 PROTHROMBIN TIME: CPT | Performed by: EMERGENCY MEDICINE

## 2024-08-30 PROCEDURE — 82272 OCCULT BLD FECES 1-3 TESTS: CPT

## 2024-08-30 PROCEDURE — 96375 TX/PRO/DX INJ NEW DRUG ADDON: CPT

## 2024-08-30 PROCEDURE — 85025 COMPLETE CBC W/AUTO DIFF WBC: CPT | Performed by: EMERGENCY MEDICINE

## 2024-08-30 PROCEDURE — 80053 COMPREHEN METABOLIC PANEL: CPT | Performed by: EMERGENCY MEDICINE

## 2024-08-30 PROCEDURE — 96361 HYDRATE IV INFUSION ADD-ON: CPT

## 2024-08-30 RX ORDER — METOCLOPRAMIDE HYDROCHLORIDE 5 MG/ML
10 INJECTION INTRAMUSCULAR; INTRAVENOUS ONCE
Status: COMPLETED | OUTPATIENT
Start: 2024-08-30 | End: 2024-08-30

## 2024-08-30 RX ORDER — DIPHENHYDRAMINE HYDROCHLORIDE 50 MG/ML
25 INJECTION INTRAMUSCULAR; INTRAVENOUS ONCE
Status: COMPLETED | OUTPATIENT
Start: 2024-08-30 | End: 2024-08-30

## 2024-08-30 RX ORDER — KETOROLAC TROMETHAMINE 15 MG/ML
15 INJECTION, SOLUTION INTRAMUSCULAR; INTRAVENOUS ONCE
Status: COMPLETED | OUTPATIENT
Start: 2024-08-30 | End: 2024-08-30

## 2024-08-30 NOTE — ED PROVIDER NOTES
Patient Seen in: [facility] In [place]      History     Chief Complaint   Patient presents with    Anal Problem     Stated Complaint: Blood in urine and stool for three weeks.    Subjective:   43-year-old female with multiple complaints.  She reports chronic hematuria and GI bleeding gradual onset, not thunderclap, not the location of the left.  Headaches in the past exactly similar to this.  No nausea or vomiting.  She does mention that she does get GI bleeding intermittently.  States she was seen here couple months ago for this and several months ago as well.  She has not yet [facility] with [facility] as an outpatient or made an appointment.  She did have a remote colonoscopy years ago and that was normal.  No abdominal pain.  No dysuria.  No history of kidney stones.  States he is here tonight for her headache/migraine            Objective:   Past Medical History:    Anxiety    Attention deficit disorder with hyperactivity(314.01)    Carnitine deficiency (HCC)    Esophageal reflux    Estrogen deficiency    Foot fracture         Generalized weakness    Iron deficiency anemia    Migraine, unspecified, without mention of intractable migraine without mention of status migrainosus    Migraines    Pain in joint, pelvic region and thigh    Personal history of methicillin resistant Staphylococcus aureus    Unspecified essential hypertension              Past Surgical History:   Procedure Laterality Date          , 2005    Hysterectomy  2009    Other surgical history  1998    Gallbladder     Other surgical history  1996    Nasal septal deviation repair    Plastic surgery - external N/A 2023    \"mommy makeover\"    T&a  1996                Social History     Socioeconomic History    Marital status: Unknown   Tobacco Use    Smoking status: Former     Passive exposure: Current    Smokeless tobacco: Never   Vaping Use    Vaping status: Some Days   Substance  and Sexual Activity    Alcohol use: No    Drug use: Yes     Types: Cannabis   Other Topics Concern    Caffeine Concern Yes     Comment: 0-6 oz daily    Exercise Yes     Comment: Yoga daily, cardio & Wts 7 times/week              Review of Systems   Constitutional:  Negative for fever.   Respiratory:  Negative for shortness of breath.    Cardiovascular:  Negative for chest pain.   Gastrointestinal:  Positive for blood in stool. Negative for vomiting.   Genitourinary:  Positive for hematuria.   Musculoskeletal:  Negative for back pain.   Neurological:  Positive for headaches. Negative for dizziness, facial asymmetry, speech difficulty, light-headedness and numbness.   Hematological:  Does not bruise/bleed easily.       Positive for stated Chief Complaint: Anal Problem    Other systems are as noted in HPI.  Constitutional and vital signs reviewed.      All other systems reviewed and negative except as noted above.    Physical Exam     ED Triage Vitals [08/30/24 0351]   BP (!) 166/97   Pulse 110   Resp 18   Temp 99.1 °F (37.3 °C)   Temp src Oral   SpO2 98 %   O2 Device None (Room air)       Current Vitals:   Vital Signs  BP: 119/74  Pulse: 86  Resp: 17  Temp: 99.1 °F (37.3 °C)  Temp src: Oral    Oxygen Therapy  SpO2: 99 %  O2 Device: None (Room air)            Physical Exam  Vitals and nursing note reviewed.   Constitutional:       Appearance: She is not ill-appearing, toxic-appearing or diaphoretic.   HENT:      Head: Normocephalic.      Nose: Nose normal.      Mouth/Throat:      Pharynx: Oropharynx is clear.   Eyes:      Pupils: Pupils are equal, round, and reactive to light.   Cardiovascular:      Rate and Rhythm: Normal rate.      Pulses: Normal pulses.      Heart sounds: Normal heart sounds.   Pulmonary:      Effort: Pulmonary effort is normal.   Abdominal:      General: There is no distension.      Palpations: Abdomen is soft.      Tenderness: There is no abdominal tenderness. There is no guarding or rebound.    Genitourinary:     Rectum: Guaiac result negative.   Musculoskeletal:         General: Normal range of motion.      Cervical back: Neck supple.   Skin:     General: Skin is warm and dry.   Neurological:      General: No focal deficit present.      Mental Status: She is alert.         Rectal exam with female tech chaperone.  Nonthrombosed external hemorrhoids.  Brown stool, guaiac negative.    ED Course     Labs Reviewed   COMP METABOLIC PANEL (14) - Abnormal; Notable for the following components:       Result Value    Glucose 113 (*)     Creatinine 1.15 (*)     Alkaline Phosphatase 121 (*)     All other components within normal limits   PROTHROMBIN TIME (PT) - Normal   CBC WITH DIFFERENTIAL WITH PLATELET             Patient has had 2 CT abdomen pelvis this year for similar complaints.  Both of showed a moderate stool burden without any significant findings.  She has no acute complaints regarding her hematuria and/or GI bleeding today.         MDM      External chart review demonstrates previous imaging for hematuria and GI bleeding    Differential diagnosis includes, but is not limited to, migraine headache, tension headache, GI bleed, hematuria, kidney stone,    43-year-old female presents with migraine headache.  Also mentioned her chronic GI bleeding chronic hematuria.  Has not yet followed up with urology and/or GI.  Rectal exam as above.  Guaiac negative.  Brown stool.  No abdominal pain on exam.  No current hematuria she states.  This been an ongoing intermittent issue but as no recent bleeding.  Her neuroexam is normal.  Hemoglobin is stable.  Not on blood thinners.  Vital signs are stable.  Given migraine cocktail which composed of IV Toradol, IV Benadryl and IV Reglan with IV fluids.  She is resting comfortably.  We discharged home with neurology, GI and urology follow-up.  She is agreeable.  Her labs are overall stable.  She has a chronic elevation of her alk phos with no right upper quadrant pain.  She  has a ride home.  Shared decision made utilized, return precaution provided.  Stressed the importance of outpatient follow-up and need for colonoscopy and urology and neurological consultation as an outpatient.  She has no red flag symptoms for subarachnoid hemorrhage.  sHe is well-appearing nontoxic with a nonfocal neuro examination.  She is agreeable and requesting discharge home at this time.  Further workup offered, considered and discussed.  No acute or new symptoms    Patient was screened and evaluated during this visit.  As the treating physician attending to the patient, I determined within reasonable clinical confidence and prior to discharge, that an emergency medical condition was not or was no longer present.  There was no indication for further evaluation, treatment, or admission on an emergency basis.  Comprehensive verbal and written discharge and follow-up instructions were provided to help prevent relapse or worsening.  Patient was instructed to follow-up with their primary care provider for further evaluation and treatment, return immediately to ER for worsening, concerning, new, or changing/persisting symptoms. I discussed the case with the patient and they had no questions, complaints, or concerns.  Patient was comfortable going home.     Per the discharge paperwork, patients are encouraged to and given instructions on how to sign up for [facility], where they have access to their records, including any/all incidental findings.     This note was prepared using Dragon Medical voice recognition dictation software. As a result errors may occur. When identified these errors have been corrected. While every attempt is made to correct errors during dictation discrepancies may still exist    Note to patient: The 21st Century Cures Act makes medical notes like these available to patients in the interest of transparency. However, this is a medical document intended as peer to peer communication. It is  written in medical language and may contain abbreviations or verbiage that are unfamiliar. It may appear blunt or direct. Medical documents are intended to carry relevant information, facts as evident, and the clinical opinion of the practitioner.                                      Medical Decision Making      Disposition and Plan     Clinical Impression:  1. Other migraine with status migrainosus, not intractable    2. Gastrointestinal hemorrhage, unspecified gastrointestinal hemorrhage type    3. Hematuria, unspecified type         Disposition:  Discharge  8/30/2024  4:56 am    Follow-up:  Rodo Solorio MD  [address]    [facility] [id number] [phone]    Follow up      Ash Dent MD  [address]  [address]  [facility] [id number] [phone]    Follow up      [facility] - [place] [id number] [facility] [address]  Follow up      Lui Mckeon MD  [address]  [address]  [facility] [address] [phone]    Follow up            Medications Prescribed:  Current Discharge Medication List

## 2024-08-30 NOTE — ED INITIAL ASSESSMENT (HPI)
Patient arrives from home with c/o rectal bleeding. States she seen PCP yesterday for bleeding. States worse tonight

## 2024-08-31 ENCOUNTER — HOSPITAL ENCOUNTER (EMERGENCY)
Age: 44
Discharge: HOME | End: 2024-08-31
Attending: EMERGENCY MEDICINE
Payer: MEDICAID

## 2024-08-31 ENCOUNTER — APPOINTMENT (OUTPATIENT)
Dept: CT IMAGING | Age: 44
End: 2024-08-31
Attending: NURSE PRACTITIONER
Payer: MEDICAID

## 2024-08-31 VITALS
BODY MASS INDEX: 28.49 KG/M2 | HEART RATE: 82 BPM | TEMPERATURE: 98 F | SYSTOLIC BLOOD PRESSURE: 131 MMHG | RESPIRATION RATE: 16 BRPM | HEIGHT: 65 IN | OXYGEN SATURATION: 95 % | DIASTOLIC BLOOD PRESSURE: 83 MMHG | WEIGHT: 171 LBS

## 2024-08-31 DIAGNOSIS — G43.819 OTHER MIGRAINE WITHOUT STATUS MIGRAINOSUS, INTRACTABLE: ICD-10-CM

## 2024-08-31 DIAGNOSIS — K64.9 BLEEDING HEMORRHOID: Primary | ICD-10-CM

## 2024-08-31 LAB
ALBUMIN SERPL-MCNC: 4.2 G/DL (ref 3.4–5)
ALBUMIN/GLOB SERPL: 1.3 {RATIO} (ref 1–2)
ALP LIVER SERPL-CCNC: 119 U/L
ALT SERPL-CCNC: 32 U/L
ANION GAP SERPL CALC-SCNC: 6 MMOL/L (ref 0–18)
AST SERPL-CCNC: 23 U/L (ref 15–37)
BASOPHILS # BLD AUTO: 0.08 X10(3) UL (ref 0–0.2)
BASOPHILS NFR BLD AUTO: 0.6 %
BILIRUB SERPL-MCNC: 0.3 MG/DL (ref 0.1–2)
BUN BLD-MCNC: 11 MG/DL (ref 9–23)
CALCIUM BLD-MCNC: 8.8 MG/DL (ref 8.5–10.1)
CHLORIDE SERPL-SCNC: 106 MMOL/L (ref 98–112)
CO2 SERPL-SCNC: 25 MMOL/L (ref 21–32)
CREAT BLD-MCNC: 1.03 MG/DL
EGFRCR SERPLBLD CKD-EPI 2021: 69 ML/MIN/1.73M2 (ref 60–?)
EOSINOPHIL # BLD AUTO: 0.13 X10(3) UL (ref 0–0.7)
EOSINOPHIL NFR BLD AUTO: 1 %
ERYTHROCYTE [DISTWIDTH] IN BLOOD BY AUTOMATED COUNT: 13.1 %
GLOBULIN PLAS-MCNC: 3.3 G/DL (ref 2.8–4.4)
GLUCOSE BLD-MCNC: 106 MG/DL (ref 70–99)
HCT VFR BLD AUTO: 35.8 %
HGB BLD-MCNC: 11.9 G/DL
IMM GRANULOCYTES # BLD AUTO: 0.05 X10(3) UL (ref 0–1)
IMM GRANULOCYTES NFR BLD: 0.4 %
LYMPHOCYTES # BLD AUTO: 3.54 X10(3) UL (ref 1–4)
LYMPHOCYTES NFR BLD AUTO: 26.2 %
MCH RBC QN AUTO: 27.7 PG (ref 26–34)
MCHC RBC AUTO-ENTMCNC: 33.2 G/DL (ref 31–37)
MCV RBC AUTO: 83.3 FL
MONOCYTES # BLD AUTO: 1.1 X10(3) UL (ref 0.1–1)
MONOCYTES NFR BLD AUTO: 8.2 %
NEUTROPHILS # BLD AUTO: 8.59 X10 (3) UL (ref 1.5–7.7)
NEUTROPHILS # BLD AUTO: 8.59 X10(3) UL (ref 1.5–7.7)
NEUTROPHILS NFR BLD AUTO: 63.6 %
OSMOLALITY SERPL CALC.SUM OF ELEC: 284 MOSM/KG (ref 275–295)
PLATELET # BLD AUTO: 346 10(3)UL (ref 150–450)
POTASSIUM SERPL-SCNC: 3.6 MMOL/L (ref 3.5–5.1)
PROT SERPL-MCNC: 7.5 G/DL (ref 6.4–8.2)
RBC # BLD AUTO: 4.3 X10(6)UL
SODIUM SERPL-SCNC: 137 MMOL/L (ref 136–145)
WBC # BLD AUTO: 13.5 X10(3) UL (ref 4–11)

## 2024-08-31 PROCEDURE — 99284 EMERGENCY DEPT VISIT MOD MDM: CPT

## 2024-08-31 PROCEDURE — 80053 COMPREHEN METABOLIC PANEL: CPT

## 2024-08-31 PROCEDURE — 96375 TX/PRO/DX INJ NEW DRUG ADDON: CPT

## 2024-08-31 PROCEDURE — 80053 COMPREHEN METABOLIC PANEL: CPT | Performed by: EMERGENCY MEDICINE

## 2024-08-31 PROCEDURE — 74177 CT ABD & PELVIS W/CONTRAST: CPT | Performed by: NURSE PRACTITIONER

## 2024-08-31 PROCEDURE — 96374 THER/PROPH/DIAG INJ IV PUSH: CPT

## 2024-08-31 PROCEDURE — 82272 OCCULT BLD FECES 1-3 TESTS: CPT

## 2024-08-31 PROCEDURE — 96361 HYDRATE IV INFUSION ADD-ON: CPT

## 2024-08-31 PROCEDURE — 85025 COMPLETE CBC W/AUTO DIFF WBC: CPT | Performed by: EMERGENCY MEDICINE

## 2024-08-31 PROCEDURE — 85025 COMPLETE CBC W/AUTO DIFF WBC: CPT

## 2024-08-31 RX ORDER — DIPHENHYDRAMINE HYDROCHLORIDE 50 MG/ML
25 INJECTION INTRAMUSCULAR; INTRAVENOUS ONCE
Status: COMPLETED | OUTPATIENT
Start: 2024-08-31 | End: 2024-08-31

## 2024-08-31 RX ORDER — POLYETHYLENE GLYCOL 3350 17 G/17G
17 POWDER, FOR SOLUTION ORAL DAILY PRN
Qty: 12 EACH | Refills: 0 | Status: SHIPPED | OUTPATIENT
Start: 2024-08-31 | End: 2024-09-30

## 2024-08-31 RX ORDER — DEXAMETHASONE SODIUM PHOSPHATE 10 MG/ML
10 INJECTION, SOLUTION INTRAMUSCULAR; INTRAVENOUS ONCE
Status: COMPLETED | OUTPATIENT
Start: 2024-08-31 | End: 2024-08-31

## 2024-08-31 RX ORDER — METOCLOPRAMIDE 10 MG/1
10 TABLET ORAL 3 TIMES DAILY PRN
Qty: 20 TABLET | Refills: 0 | Status: SHIPPED | OUTPATIENT
Start: 2024-08-31 | End: 2024-09-30

## 2024-08-31 RX ORDER — METOCLOPRAMIDE HYDROCHLORIDE 5 MG/ML
10 INJECTION INTRAMUSCULAR; INTRAVENOUS ONCE
Status: COMPLETED | OUTPATIENT
Start: 2024-08-31 | End: 2024-08-31

## 2024-08-31 RX ORDER — SUMATRIPTAN 50 MG/1
50 TABLET, FILM COATED ORAL EVERY 2 HOUR PRN
Qty: 9 TABLET | Refills: 0 | Status: SHIPPED | OUTPATIENT
Start: 2024-08-31 | End: 2024-09-30

## 2024-08-31 NOTE — DISCHARGE INSTRUCTIONS
Follow-up with your primary care physician.  Take MiraLAX daily to help pass stool.  Once you have had significant bowel movements stop MiraLAX

## 2024-08-31 NOTE — ED PROVIDER NOTES
Patient Seen in: [facility] In [place]      History     Chief Complaint   Patient presents with    GI Bleeding     Stated Complaint: bloody stools, vomiting, dark emesis    Subjective:   43 female presents to to the emergency department for migraine headache, bloody stools.  Patient has a history of hemorrhoids states she noticed bright red blood within her toilet without stool.  Patient was seen here last night as well.            Objective:   Past Medical History:    Anxiety    Attention deficit disorder with hyperactivity(314.01)    Carnitine deficiency (HCC)    Esophageal reflux    Estrogen deficiency    Foot fracture         Generalized weakness    Iron deficiency anemia    Migraine, unspecified, without mention of intractable migraine without mention of status migrainosus    Migraines    Pain in joint, pelvic region and thigh    Personal history of methicillin resistant Staphylococcus aureus    Unspecified essential hypertension              Past Surgical History:   Procedure Laterality Date          ,     Hysterectomy  2009    Other surgical history  1998    Gallbladder     Other surgical history  1996    Nasal septal deviation repair    Plastic surgery - external N/A 2023    \"mommy makeover\"    T&a  1996                Social History     Socioeconomic History    Marital status: Unknown   Tobacco Use    Smoking status: Former     Passive exposure: Current    Smokeless tobacco: Never   Vaping Use    Vaping status: Some Days   Substance and Sexual Activity    Alcohol use: No    Drug use: Yes     Types: Cannabis   Other Topics Concern    Caffeine Concern Yes     Comment: 0-6 oz daily    Exercise Yes     Comment: Yoga daily, cardio & Wts 7 times/week              Review of Systems   Constitutional: Negative.    Respiratory: Negative.     Cardiovascular: Negative.    Gastrointestinal:  Positive for abdominal pain and blood in stool.   Skin:  Negative.    Neurological: Negative.        Positive for stated Chief Complaint: GI Bleeding    Other systems are as noted in HPI.  Constitutional and vital signs reviewed.      All other systems reviewed and negative except as noted above.    Physical Exam     ED Triage Vitals [08/31/24 0922]   BP (!) 176/105   Pulse 110   Resp 16   Temp 98 °F (36.7 °C)   Temp src    SpO2 97 %   O2 Device None (Room air)       Current Vitals:   Vital Signs  BP: 131/83  Pulse: 82  Resp: 16  Temp: 98 °F (36.7 °C)    Oxygen Therapy  SpO2: 95 %  O2 Device: None (Room air)            Physical Exam  Vitals and nursing note reviewed.   Constitutional:       General: She is not in acute distress.  HENT:      Head: Normocephalic.   Cardiovascular:      Rate and Rhythm: Normal rate.   Pulmonary:      Effort: Pulmonary effort is normal.   Genitourinary:     Rectum: Guaiac result negative. External hemorrhoid present. No anal fissure or internal hemorrhoid.   Musculoskeletal:         General: Normal range of motion.   Skin:     General: Skin is warm and dry.   Neurological:      General: No focal deficit present.      Mental Status: She is alert and oriented to person, place, and time.               ED Course     Labs Reviewed   CBC WITH DIFFERENTIAL WITH PLATELET - Abnormal; Notable for the following components:       Result Value    WBC 13.5 (*)     HGB 11.9 (*)     Neutrophil Absolute Prelim 8.59 (*)     Neutrophil Absolute 8.59 (*)     Monocyte Absolute 1.10 (*)     All other components within normal limits   COMP METABOLIC PANEL (14) - Abnormal; Notable for the following components:    Glucose 106 (*)     Creatinine 1.03 (*)     Alkaline Phosphatase 119 (*)     All other components within normal limits   RAINBOW DRAW BLUE     CT ABDOMEN+PELVIS(CONTRAST ONLY)(CPT=74177)    Result Date: 8/31/2024  PROCEDURE:  CT ABDOMEN+PELVIS (CONTRAST ONLY) (CPT=74177)  COMPARISON:  [facility], CT, CT ABDOMEN+PELVIS(CONTRAST ONLY)(CPT=74177), 7/11/2024,  7:59 AM.  INDICATIONS:  bloody stools, vomiting, dark emesis  TECHNIQUE:  CT scanning was performed from the dome of the diaphragm to the pubic symphysis with non-ionic intravenous contrast material. Post contrast coronal MPR imaging was performed.  Dose reduction techniques were used. Dose information is transmitted to the ACR (American College of Radiology) NRDR (National Radiology Data Registry) which includes the Dose Index Registry.  PATIENT STATED HISTORY:(As transcribed by Technologist)  Patient has had bloody stool, vomiting and bilateral lower abdominal pain.   CONTRAST USED:  100cc of Isovue 370  FINDINGS:  LIVER:  No enlargement, atrophy, abnormal density, or significant focal lesion.  BILIARY:  Status post cholecystectomy.  No biliary tree dilation. PANCREAS:  No lesion, fluid collection, ductal dilatation, or atrophy.  SPLEEN:  No enlargement or focal lesion.  KIDNEYS:  No mass, obstruction, or calcification.  ADRENALS:  No mass or enlargement.  AORTA/VASCULAR:  No aneurysm or dissection.  RETROPERITONEUM:  No mass or adenopathy.  BOWEL/MESENTERY:  No free air.  No free fluid.  Again noted is a moderate to large amount of stool in the right and transverse colon.  The left colon to the rectum is decompressed.  No definite obstructing lesion noted.  The appendix is normal.  The small bowel in the mesentery are unremarkable.  The stomach and duodenum are unremarkable. ABDOMINAL WALL:  No mass or hernia.  URINARY BLADDER:  Mildly distended bladder without bladder wall thickening or calcification. PELVIC NODES:  No adenopathy.  PELVIC ORGANS:  No visible mass.  Pelvic organs appropriate for patient age.  BONES:  No fracture.  Moderate degenerative disc disease L4-5 and L5-S1 unchanged. LUNG BASES:  No visible pulmonary or pleural disease.  OTHER:  Negative.             CONCLUSION:  1. The study is unchanged compared to 7/11/2024.  There is moderate to large amount of stool in the right and transverse colon  with a decompressed left colon to the level the rectum.  This of uncertain significance.  No obstructing lesion.  No free air or free fluid.    LOCATION:  [place] [id number]   Dictated by (CST): Andrew Hampton MD on 8/31/2024 at 12:57 PM     Finalized by (CST): Andrew Hampton MD on 8/31/2024 at 1:02 PM                     [facility]        Medical Decision Making  Pertinent Labs & Imaging studies reviewed. (See chart for details).  Patient coming in with headache, rectal bleeding.   Differential diagnosis includes migraine headache, bleeding hemorrhoid  Will discharge on Reglan, MiraLAX, sumatriptan.   Patient is comfortable with this plan.     Overall Pt looks good. Non-toxic, well-hydrated and in no respiratory distress. Vital signs are reassuring. Exam is reassuring. I do not believe pt requires and additional diagnostic studies or intervention. I believe pt can be discharged home to continue evaluation as an outpatient. Follow-up provider given. Discharge instructions given and reviewed. Return for any problems. All understand and agrees with the plan.        Problems Addressed:  Bleeding hemorrhoid: acute illness or injury  Other migraine without status migrainosus, intractable: acute illness or injury        Disposition and Plan     Clinical Impression:  1. Bleeding hemorrhoid    2. Other migraine without status migrainosus, intractable         Disposition:  Discharge  8/31/2024  1:14 pm    Follow-up:  Lui Mckeon MD  [id number] [facility] [address]  [address] [place] [id number] [phone]    Follow up            Medications Prescribed:  Current Discharge Medication List        START taking these medications    Details   polyethylene glycol, PEG 3350, 17 g Oral Powd Pack Take 17 g by mouth daily as needed.  Qty: 12 each, Refills: 0      SUMAtriptan 50 MG Oral Tab Take 1 tablet (50 mg total) by mouth every 2 (two) hours as needed for Migraine. No more than 4 tablets/24 hours.  Qty: 9 tablet, Refills: 0       metoclopramide 10 MG Oral Tab Take 1 tablet (10 mg total) by mouth 3 (three) times daily as needed.  Qty: 20 tablet, Refills: 0

## 2024-09-05 ENCOUNTER — HOSPITAL ENCOUNTER (EMERGENCY)
Age: 44
Discharge: HOME | End: 2024-09-05
Attending: EMERGENCY MEDICINE
Payer: MEDICAID

## 2024-09-05 ENCOUNTER — APPOINTMENT (OUTPATIENT)
Dept: GENERAL RADIOLOGY | Age: 44
End: 2024-09-05
Attending: EMERGENCY MEDICINE
Payer: MEDICAID

## 2024-09-05 VITALS
HEIGHT: 64 IN | BODY MASS INDEX: 29.19 KG/M2 | TEMPERATURE: 99 F | DIASTOLIC BLOOD PRESSURE: 82 MMHG | OXYGEN SATURATION: 98 % | WEIGHT: 171 LBS | RESPIRATION RATE: 16 BRPM | HEART RATE: 71 BPM | SYSTOLIC BLOOD PRESSURE: 119 MMHG

## 2024-09-05 DIAGNOSIS — K62.5 RECTAL BLEEDING: ICD-10-CM

## 2024-09-05 DIAGNOSIS — R10.9 ABDOMINAL PAIN OF UNKNOWN ETIOLOGY: ICD-10-CM

## 2024-09-05 DIAGNOSIS — K59.00 CONSTIPATION, UNSPECIFIED CONSTIPATION TYPE: Primary | ICD-10-CM

## 2024-09-05 LAB
ALBUMIN SERPL-MCNC: 4.2 G/DL (ref 3.4–5)
ALBUMIN/GLOB SERPL: 1.2 {RATIO} (ref 1–2)
ALP LIVER SERPL-CCNC: 115 U/L
ALT SERPL-CCNC: 38 U/L
ANION GAP SERPL CALC-SCNC: 3 MMOL/L (ref 0–18)
AST SERPL-CCNC: 23 U/L (ref 15–37)
BASOPHILS # BLD AUTO: 0.05 X10(3) UL (ref 0–0.2)
BASOPHILS NFR BLD AUTO: 0.5 %
BILIRUB SERPL-MCNC: 0.2 MG/DL (ref 0.1–2)
BILIRUB UR QL STRIP.AUTO: NEGATIVE
BUN BLD-MCNC: 18 MG/DL (ref 9–23)
CALCIUM BLD-MCNC: 9.6 MG/DL (ref 8.5–10.1)
CHLORIDE SERPL-SCNC: 103 MMOL/L (ref 98–112)
CLARITY UR REFRACT.AUTO: CLEAR
CO2 SERPL-SCNC: 29 MMOL/L (ref 21–32)
COLOR UR AUTO: YELLOW
CREAT BLD-MCNC: 1.12 MG/DL
EGFRCR SERPLBLD CKD-EPI 2021: 62 ML/MIN/1.73M2 (ref 60–?)
EOSINOPHIL # BLD AUTO: 0.16 X10(3) UL (ref 0–0.7)
EOSINOPHIL NFR BLD AUTO: 1.7 %
ERYTHROCYTE [DISTWIDTH] IN BLOOD BY AUTOMATED COUNT: 13 %
GLOBULIN PLAS-MCNC: 3.5 G/DL (ref 2.8–4.4)
GLUCOSE BLD-MCNC: 92 MG/DL (ref 70–99)
GLUCOSE UR STRIP.AUTO-MCNC: NEGATIVE MG/DL
HCT VFR BLD AUTO: 36.5 %
HGB BLD-MCNC: 12.1 G/DL
IMM GRANULOCYTES # BLD AUTO: 0.03 X10(3) UL (ref 0–1)
IMM GRANULOCYTES NFR BLD: 0.3 %
KETONES UR STRIP.AUTO-MCNC: NEGATIVE MG/DL
LEUKOCYTE ESTERASE UR QL STRIP.AUTO: NEGATIVE
LIPASE SERPL-CCNC: 21 U/L (ref 12–53)
LYMPHOCYTES # BLD AUTO: 3.21 X10(3) UL (ref 1–4)
LYMPHOCYTES NFR BLD AUTO: 35 %
MCH RBC QN AUTO: 27.3 PG (ref 26–34)
MCHC RBC AUTO-ENTMCNC: 33.2 G/DL (ref 31–37)
MCV RBC AUTO: 82.2 FL
MONOCYTES # BLD AUTO: 0.6 X10(3) UL (ref 0.1–1)
MONOCYTES NFR BLD AUTO: 6.5 %
NEUTROPHILS # BLD AUTO: 5.12 X10 (3) UL (ref 1.5–7.7)
NEUTROPHILS # BLD AUTO: 5.12 X10(3) UL (ref 1.5–7.7)
NEUTROPHILS NFR BLD AUTO: 56 %
NITRITE UR QL STRIP.AUTO: NEGATIVE
OSMOLALITY SERPL CALC.SUM OF ELEC: 282 MOSM/KG (ref 275–295)
PH UR STRIP.AUTO: 5.5 [PH] (ref 5–8)
PLATELET # BLD AUTO: 418 10(3)UL (ref 150–450)
POTASSIUM SERPL-SCNC: 4.1 MMOL/L (ref 3.5–5.1)
PROT SERPL-MCNC: 7.7 G/DL (ref 6.4–8.2)
PROT UR STRIP.AUTO-MCNC: NEGATIVE MG/DL
RBC # BLD AUTO: 4.44 X10(6)UL
RBC UR QL AUTO: NEGATIVE
SODIUM SERPL-SCNC: 135 MMOL/L (ref 136–145)
SP GR UR STRIP.AUTO: 1.01 (ref 1–1.03)
UROBILINOGEN UR STRIP.AUTO-MCNC: 0.2 MG/DL
WBC # BLD AUTO: 9.2 X10(3) UL (ref 4–11)

## 2024-09-05 PROCEDURE — 96375 TX/PRO/DX INJ NEW DRUG ADDON: CPT

## 2024-09-05 PROCEDURE — 96374 THER/PROPH/DIAG INJ IV PUSH: CPT

## 2024-09-05 PROCEDURE — 83690 ASSAY OF LIPASE: CPT | Performed by: EMERGENCY MEDICINE

## 2024-09-05 PROCEDURE — 99285 EMERGENCY DEPT VISIT HI MDM: CPT

## 2024-09-05 PROCEDURE — 74019 RADEX ABDOMEN 2 VIEWS: CPT | Performed by: EMERGENCY MEDICINE

## 2024-09-05 PROCEDURE — 85025 COMPLETE CBC W/AUTO DIFF WBC: CPT | Performed by: EMERGENCY MEDICINE

## 2024-09-05 PROCEDURE — 80053 COMPREHEN METABOLIC PANEL: CPT | Performed by: EMERGENCY MEDICINE

## 2024-09-05 PROCEDURE — 99284 EMERGENCY DEPT VISIT MOD MDM: CPT

## 2024-09-05 PROCEDURE — 96361 HYDRATE IV INFUSION ADD-ON: CPT

## 2024-09-05 PROCEDURE — 81003 URINALYSIS AUTO W/O SCOPE: CPT | Performed by: EMERGENCY MEDICINE

## 2024-09-05 RX ORDER — POLYETHYLENE GLYCOL 3350 17 G/17G
17 POWDER, FOR SOLUTION ORAL DAILY PRN
Qty: 12 EACH | Refills: 0 | Status: SHIPPED | OUTPATIENT
Start: 2024-09-05 | End: 2024-10-05

## 2024-09-05 RX ORDER — DOCUSATE SODIUM 100 MG/1
100 CAPSULE, LIQUID FILLED ORAL 2 TIMES DAILY
Qty: 60 CAPSULE | Refills: 0 | Status: SHIPPED | OUTPATIENT
Start: 2024-09-05 | End: 2024-10-05

## 2024-09-05 RX ORDER — DIPHENHYDRAMINE HYDROCHLORIDE 50 MG/ML
25 INJECTION INTRAMUSCULAR; INTRAVENOUS ONCE
Status: COMPLETED | OUTPATIENT
Start: 2024-09-05 | End: 2024-09-05

## 2024-09-05 RX ORDER — METOCLOPRAMIDE HYDROCHLORIDE 5 MG/ML
10 INJECTION INTRAMUSCULAR; INTRAVENOUS ONCE
Status: COMPLETED | OUTPATIENT
Start: 2024-09-05 | End: 2024-09-05

## 2024-09-05 RX ORDER — KETOROLAC TROMETHAMINE 15 MG/ML
15 INJECTION, SOLUTION INTRAMUSCULAR; INTRAVENOUS ONCE
Status: COMPLETED | OUTPATIENT
Start: 2024-09-05 | End: 2024-09-05

## 2024-09-06 NOTE — ED PROVIDER NOTES
Patient Seen in: [facility] In [place]      History     Chief Complaint   Patient presents with    Abdomen/Flank Pain     Stated Complaint: abd pain. N/V. no relief since last visit. rectal bleeding    Subjective:   HPI    Patient is a 43-year-old woman presents with recurrent diffuse abdominal cramping with blood in her stools.  Multiple ER visits.  Visit was reviewed.  She has had multiple CT scans without clear etiology.  Diagnosed presumptively with hemorrhoids.  Was follow-up supposed to follow-up with GI which she has not done yet.  No other specific complaints.  Family at bedside.  Describes at 9 and 10 crampy abdominal pains on the umbilicus.  Recurrent bloody stools.  Patient.    Objective:   Past Medical History:    Anxiety    Attention deficit disorder with hyperactivity(314.01)    Carnitine deficiency (HCC)    Esophageal reflux    Estrogen deficiency    Foot fracture         Generalized weakness    Iron deficiency anemia    Migraine, unspecified, without mention of intractable migraine without mention of status migrainosus    Migraines    Pain in joint, pelvic region and thigh    Personal history of methicillin resistant Staphylococcus aureus    Unspecified essential hypertension              Past Surgical History:   Procedure Laterality Date          , 2005    Hysterectomy  2009    Other surgical history  1998    Gallbladder     Other surgical history  1996    Nasal septal deviation repair    Plastic surgery - external N/A 2023    \"mommy makeover\"    T&a  1996                Social History     Socioeconomic History    Marital status: Unknown   Tobacco Use    Smoking status: Former     Passive exposure: Current    Smokeless tobacco: Never   Vaping Use    Vaping status: Some Days   Substance and Sexual Activity    Alcohol use: No    Drug use: Yes     Types: Cannabis   Other Topics Concern    Caffeine Concern Yes     Comment: 0-6 oz daily     Exercise Yes     Comment: Yoga daily, cardio & Wts 7 times/week              Review of Systems    Positive for stated Chief Complaint: Abdomen/Flank Pain    Other systems are as noted in HPI.  Constitutional and vital signs reviewed.      All other systems reviewed and negative except as noted above.    Physical Exam     ED Triage Vitals [09/05/24 2103]   BP (!) 179/109   Pulse 94   Resp 20   Temp 99 °F (37.2 °C)   Temp src Oral   SpO2 96 %   O2 Device None (Room air)       Current Vitals:   Vital Signs  BP: 119/82  Pulse: 71  Resp: 16  Temp: 99 °F (37.2 °C)  Temp src: Oral    Oxygen Therapy  SpO2: 98 %  O2 Device: None (Room air)            Physical Exam    General: Well-appearing patient of stated age resting comfortably  HEENT: Normocephalic atraumatic.  Nonicteric sclera.  Moist mucous membranes  Lungs: No tachypnea  Cardiac: No tachycardia  Abdomen: Soft voluntary guarding.  No rebound  Skin: No rashes, pallor  Neuro: No focal deficits.  Extremities: No cyanosis/edema    ED Course     Labs Reviewed   COMP METABOLIC PANEL (14) - Abnormal; Notable for the following components:       Result Value    Sodium 135 (*)     Creatinine 1.12 (*)     Alkaline Phosphatase 115 (*)     All other components within normal limits   LIPASE - Normal   CBC WITH DIFFERENTIAL WITH PLATELET   URINALYSIS WITH CULTURE REFLEX             Blood work reviewed.  BUN/creatinine ratio normal.  Urine negative.  White count 9.2.  Hemoglobin normal.     Obstructive series: I personally reviewed the films and my independent interpertaion showed increased stool without obstruction.  Official report reviewed.  Agreed.  Blood work reviewed.  Hemoglobin stable.    MDM      Recurrent abdominal pain, reports of rectal bleeding.  Differential occludes hemorrhoids, colitis, other.  Multiple recent CT scans.  Would hold imaging.  Would avoid opiates.  Was hydrated.  Was treated with Toradol Reglan and Benadryl.  Blood work reviewed.  Will check  obstructive series.  CT scan from approximately 6 days ago reviewed.  Consistent with constipation.      Recurrent abdominal pain.  Evidence of constipation by imaging.  Describes bright red blood.  Possible hemorrhoids.  Supposed to follow-up with GI.  Does have diffuse abdominal pain.  Would hold opiates.  Was treated with Toradol Reglan Benadryl here with some improvement.  Blood work reassuring.  Recent CT scans reviewed.  Will follow-up with GI.  Will follow-up with primary care.  Return if worsening symptoms or new complaints                             Medical Decision Making      Disposition and Plan     Clinical Impression:  1. Constipation, unspecified constipation type    2. Abdominal pain of unknown etiology    3. Rectal bleeding         Disposition:  Discharge  9/5/2024 11:33 pm    Follow-up:  Lui Mckeon MD  [address] [facility] [address]  [address] [place] [id number] [phone]    Follow up in 1 week(s)      [facility]  [address] [facility] [address]  Follow up in 1 week(s)            Medications Prescribed:  Discharge Medication List as of 9/5/2024 11:52 PM        START taking these medications    Details   docusate sodium 100 MG Oral Cap Take 1 capsule (100 mg total) by mouth 2 (two) times daily., Normal, Disp-60 capsule, R-0      !! polyethylene glycol, PEG 3350, 17 g Oral Powd Pack Take 17 g by mouth daily as needed., Normal, Disp-12 each, R-0       !! - Potential duplicate medications found. Please discuss with provider.

## 2024-09-06 NOTE — ED INITIAL ASSESSMENT (HPI)
Patient presents with diffuse abd pain, multiple episodes of nausea/vomiting, and bloody stool starting Saturday.   Reports multiple ED visits since onset of symptoms.

## 2024-09-06 NOTE — DISCHARGE INSTRUCTIONS
Return if increasing pain, vomiting or new complaints.  Suggest a stool softener.  Can take MiraLAX as well.  Follow-up with GI.  Consider colonoscopy.  Return if worsening symptoms, new complaints.  Recheck your blood pressure with primary care.

## 2024-09-11 ENCOUNTER — HOSPITAL ENCOUNTER (EMERGENCY)
Age: 44
Discharge: HOME | End: 2024-09-11
Attending: EMERGENCY MEDICINE
Payer: MEDICAID

## 2024-09-11 ENCOUNTER — APPOINTMENT (OUTPATIENT)
Dept: CT IMAGING | Age: 44
End: 2024-09-11
Attending: EMERGENCY MEDICINE
Payer: MEDICAID

## 2024-09-11 VITALS
SYSTOLIC BLOOD PRESSURE: 138 MMHG | RESPIRATION RATE: 16 BRPM | WEIGHT: 171.06 LBS | OXYGEN SATURATION: 99 % | DIASTOLIC BLOOD PRESSURE: 88 MMHG | BODY MASS INDEX: 29 KG/M2 | HEART RATE: 89 BPM | TEMPERATURE: 99 F

## 2024-09-11 DIAGNOSIS — R19.7 NAUSEA VOMITING AND DIARRHEA: Primary | ICD-10-CM

## 2024-09-11 DIAGNOSIS — R11.2 NAUSEA VOMITING AND DIARRHEA: Primary | ICD-10-CM

## 2024-09-11 LAB
ALBUMIN SERPL-MCNC: 2.7 G/DL (ref 3.4–5)
ALBUMIN/GLOB SERPL: 0.6 {RATIO} (ref 1–2)
ALP LIVER SERPL-CCNC: 125 U/L
ALT SERPL-CCNC: 43 U/L
ANION GAP SERPL CALC-SCNC: 11 MMOL/L (ref 0–18)
AST SERPL-CCNC: 22 U/L (ref 15–37)
BASOPHILS # BLD: 0 X10(3) UL (ref 0–0.2)
BASOPHILS NFR BLD: 0 %
BILIRUB SERPL-MCNC: 0.3 MG/DL (ref 0.1–2)
BUN BLD-MCNC: 17 MG/DL (ref 9–23)
CALCIUM BLD-MCNC: 9.3 MG/DL (ref 8.5–10.1)
CHLORIDE SERPL-SCNC: 96 MMOL/L (ref 98–112)
CO2 SERPL-SCNC: 22 MMOL/L (ref 21–32)
CREAT BLD-MCNC: 0.98 MG/DL
EGFRCR SERPLBLD CKD-EPI 2021: 73 ML/MIN/1.73M2 (ref 60–?)
EOSINOPHIL # BLD: 0 X10(3) UL (ref 0–0.7)
EOSINOPHIL NFR BLD: 0 %
ERYTHROCYTE [DISTWIDTH] IN BLOOD BY AUTOMATED COUNT: 13.1 %
GLOBULIN PLAS-MCNC: 4.2 G/DL (ref 2.8–4.4)
GLUCOSE BLD-MCNC: 138 MG/DL (ref 70–99)
HCT VFR BLD AUTO: 40.9 %
HGB BLD-MCNC: 13.7 G/DL
LIPASE SERPL-CCNC: 59 U/L (ref 12–53)
LYMPHOCYTES NFR BLD: 19 %
LYMPHOCYTES NFR BLD: 4.81 X10(3) UL (ref 1–4)
MCH RBC QN AUTO: 26.9 PG (ref 26–34)
MCHC RBC AUTO-ENTMCNC: 33.5 G/DL (ref 31–37)
MCV RBC AUTO: 80.2 FL
METAMYELOCYTES # BLD: 1.27 X10(3) UL
METAMYELOCYTES NFR BLD: 5 %
MONOCYTES # BLD: 2.53 X10(3) UL (ref 0.1–1)
MONOCYTES NFR BLD: 10 %
MORPHOLOGY: NORMAL
MYELOCYTES # BLD: 0.51 X10(3) UL
MYELOCYTES NFR BLD: 2 %
NEUTROPHILS # BLD AUTO: 15.25 X10 (3) UL (ref 1.5–7.7)
NEUTROPHILS NFR BLD: 64 %
NEUTS HYPERSEG # BLD: 16.19 X10(3) UL (ref 1.5–7.7)
OSMOLALITY SERPL CALC.SUM OF ELEC: 272 MOSM/KG (ref 275–295)
PLATELET # BLD AUTO: 500 10(3)UL (ref 150–450)
PLATELET MORPHOLOGY: NORMAL
POTASSIUM SERPL-SCNC: 3.3 MMOL/L (ref 3.5–5.1)
PROT SERPL-MCNC: 6.9 G/DL (ref 6.4–8.2)
RBC # BLD AUTO: 5.1 X10(6)UL
SODIUM SERPL-SCNC: 129 MMOL/L (ref 136–145)
TOTAL CELLS COUNTED BLD: 100
WBC # BLD AUTO: 25.3 X10(3) UL (ref 4–11)

## 2024-09-11 PROCEDURE — 99284 EMERGENCY DEPT VISIT MOD MDM: CPT

## 2024-09-11 PROCEDURE — 96374 THER/PROPH/DIAG INJ IV PUSH: CPT

## 2024-09-11 PROCEDURE — 96361 HYDRATE IV INFUSION ADD-ON: CPT

## 2024-09-11 PROCEDURE — 70450 CT HEAD/BRAIN W/O DYE: CPT | Performed by: EMERGENCY MEDICINE

## 2024-09-11 PROCEDURE — 83690 ASSAY OF LIPASE: CPT | Performed by: EMERGENCY MEDICINE

## 2024-09-11 PROCEDURE — 80053 COMPREHEN METABOLIC PANEL: CPT | Performed by: EMERGENCY MEDICINE

## 2024-09-11 PROCEDURE — 85027 COMPLETE CBC AUTOMATED: CPT | Performed by: EMERGENCY MEDICINE

## 2024-09-11 PROCEDURE — 99285 EMERGENCY DEPT VISIT HI MDM: CPT

## 2024-09-11 PROCEDURE — 96375 TX/PRO/DX INJ NEW DRUG ADDON: CPT

## 2024-09-11 PROCEDURE — 85025 COMPLETE CBC W/AUTO DIFF WBC: CPT | Performed by: EMERGENCY MEDICINE

## 2024-09-11 PROCEDURE — 85007 BL SMEAR W/DIFF WBC COUNT: CPT | Performed by: EMERGENCY MEDICINE

## 2024-09-11 RX ORDER — DIPHENHYDRAMINE HYDROCHLORIDE 50 MG/ML
25 INJECTION INTRAMUSCULAR; INTRAVENOUS ONCE
Status: COMPLETED | OUTPATIENT
Start: 2024-09-11 | End: 2024-09-11

## 2024-09-11 RX ORDER — DICYCLOMINE HCL 20 MG
20 TABLET ORAL 4 TIMES DAILY PRN
Qty: 30 TABLET | Refills: 0 | Status: SHIPPED | OUTPATIENT
Start: 2024-09-11 | End: 2024-10-11

## 2024-09-11 RX ORDER — METOCLOPRAMIDE 10 MG/1
10 TABLET ORAL 3 TIMES DAILY PRN
Qty: 20 TABLET | Refills: 0 | Status: SHIPPED | OUTPATIENT
Start: 2024-09-11 | End: 2024-10-11

## 2024-09-11 RX ORDER — KETOROLAC TROMETHAMINE 15 MG/ML
15 INJECTION, SOLUTION INTRAMUSCULAR; INTRAVENOUS ONCE
Status: COMPLETED | OUTPATIENT
Start: 2024-09-11 | End: 2024-09-11

## 2024-09-11 RX ORDER — METOCLOPRAMIDE HYDROCHLORIDE 5 MG/ML
10 INJECTION INTRAMUSCULAR; INTRAVENOUS ONCE
Status: COMPLETED | OUTPATIENT
Start: 2024-09-11 | End: 2024-09-11

## 2024-09-11 NOTE — ED PROVIDER NOTES
Patient Seen in: [facility] In [place]      History     Chief Complaint   Patient presents with    Nausea/Vomiting/Diarrhea     Stated Complaint: Vomiting x4days - NVD - co blurry vision - zofran at 2330    Subjective:   HPI    43-year-old female presenting with vomiting diarrhea she has had this for the last 5 days cannot keep anything down on her is causing her to have some blurry vision some crampy abdominal discomfort no other exacerbating relieving factors or associated symptoms.  On review of systems she does report having occasional spots in her field of vision bilaterally that seems to come and go    Objective:   Past Medical History:    Anxiety    Attention deficit disorder with hyperactivity(314.01)    Carnitine deficiency (HCC)    Esophageal reflux    Estrogen deficiency    Foot fracture         Generalized weakness    Iron deficiency anemia    Migraine, unspecified, without mention of intractable migraine without mention of status migrainosus    Migraines    Pain in joint, pelvic region and thigh    Personal history of methicillin resistant Staphylococcus aureus    Unspecified essential hypertension              Past Surgical History:   Procedure Laterality Date          , 2005    Hysterectomy  2009    Other surgical history  1998    Gallbladder     Other surgical history  1996    Nasal septal deviation repair    Plastic surgery - external N/A 2023    \"mommy makeover\"    T&a  1996                Social History     Socioeconomic History    Marital status: Unknown   Tobacco Use    Smoking status: Former     Passive exposure: Current    Smokeless tobacco: Never   Vaping Use    Vaping status: Some Days   Substance and Sexual Activity    Alcohol use: No    Drug use: Yes     Types: Cannabis   Other Topics Concern    Caffeine Concern Yes     Comment: 0-6 oz daily    Exercise Yes     Comment: Yoga daily, cardio & Wts 7 times/week               Review of Systems    Positive for stated Chief Complaint: Nausea/Vomiting/Diarrhea    Other systems are as noted in HPI.  Constitutional and vital signs reviewed.      All other systems reviewed and negative except as noted above.    Physical Exam     ED Triage Vitals [09/11/24 0155]   BP (!) 171/113   Pulse 108   Resp 20   Temp 98.6 °F (37 °C)   Temp src Oral   SpO2 100 %   O2 Device None (Room air)       Current Vitals:   Vital Signs  BP: (!) 173/94  Pulse: 88  Resp: 16  Temp: 98.6 °F (37 °C)  Temp src: Oral    Oxygen Therapy  SpO2: 99 %  O2 Device: None (Room air)            Physical Exam  Awake alert patient is tearful HEENT exam is normal lungs are clear cardiovascular exam shows regular rhythm abdomen soft nontender extremities no COVID cyanosis or edema no focal neurologic deficits extract movements intact       ED Course     Labs Reviewed   COMP METABOLIC PANEL (14) - Abnormal; Notable for the following components:       Result Value    Glucose 138 (*)     Sodium 129 (*)     Potassium 3.3 (*)     Chloride 96 (*)     Calculated Osmolality 272 (*)     Alkaline Phosphatase 125 (*)     Albumin 2.7 (*)     A/G Ratio 0.6 (*)     All other components within normal limits   CBC WITH DIFFERENTIAL WITH PLATELET - Abnormal; Notable for the following components:    WBC 25.3 (*)     .0 (*)     Neutrophil Absolute Prelim 15.25 (*)     All other components within normal limits   LIPASE - Abnormal; Notable for the following components:    Lipase 59 (*)     All other components within normal limits   MANUAL DIFFERENTIAL - Abnormal; Notable for the following components:    Neutrophil Absolute Manual 16.19 (*)     Lymphocyte Absolute Manual 4.81 (*)     Monocyte Absolute Manual 2.53 (*)     Metamyelocyte Absolute Manual 1.27 (*)     Myelocyte Absolute Manual 0.51 (*)     All other components within normal limits             Differential diagnosis includes subarachnoid hemorrhage renal failure         MDM                                          Medical Decision Making  43-year-old female present with vomiting diarrhea IV established cardiac monitor shows a sinus rhythm pulse ox shows no signs of hypoxia.  CBC shows an elevated white cell count I believe is demargination related to her level of hydration metabolic panel shows no signs of renal failure lipase level slightly elevated likely due to the patient's vomiting and diarrhea for the last several days and the patient's head CT was performed.  Independent interpretation by ED physician of head CT shows no acute hemorrhage.  Patient was given IV fluids antiemetic medication pain medication patient is symptomatic better at this time serial abdominal exams obese abdomen soft nonsurgical return emerged part worsening for complaints  The patient was screened and evaluated during this visit.  As a treating physician attending to the patient, I determined, within reasonable clinical confidence and prior to discharge, that an emergency medical condition was not or was no longer present.  There was no indication for further evaluation, treatment or admission on an emergency basis.    The usual and customary discharge instructions were discussed given the patient's ER course.  We discussed signs and symptoms that should prompt the patient's immediate return to the emergency department.  Reasonable over-the-counter and prescription treatment options and physician follow-up plan was discussed.  Patient was discharged home in good condition  This note was prepared using Dragon Medical voice recognition dictation software.  As a result errors may occur.  When identified to these areas have been corrected.  While every attempt is made to correct errors during dictation discrepancies may still exist.  Please contact if there are any errors    Problems Addressed:  Nausea vomiting and diarrhea: acute illness or injury    Amount and/or Complexity of Data Reviewed  Labs: ordered.  Decision-making details documented in ED Course.  Radiology: ordered and independent interpretation performed. Decision-making details documented in ED Course.  ECG/medicine tests: ordered and independent interpretation performed. Decision-making details documented in ED Course.        Disposition and Plan     Clinical Impression:  1. Nausea vomiting and diarrhea         Disposition:  There is no disposition on file for this visit.  There is no disposition time on file for this visit.    Follow-up:  Lui Mckeon MD  [id number] [facility]  [address] [place] [id number] [phone]    Follow up in 1 week(s)            Medications Prescribed:  Current Discharge Medication List        START taking these medications    Details   !! metoclopramide 10 MG Oral Tab Take 1 tablet (10 mg total) by mouth 3 (three) times daily as needed.  Qty: 20 tablet, Refills: 0      dicyclomine 20 MG Oral Tab Take 1 tablet (20 mg total) by mouth 4 (four) times daily as needed.  Qty: 30 tablet, Refills: 0       !! - Potential duplicate medications found. Please discuss with provider.

## 2025-03-11 ENCOUNTER — APPOINTMENT (OUTPATIENT)
Dept: GENERAL RADIOLOGY | Facility: HOSPITAL | Age: 45
End: 2025-03-11
Attending: EMERGENCY MEDICINE
Payer: MEDICAID

## 2025-03-11 ENCOUNTER — HOSPITAL ENCOUNTER (EMERGENCY)
Facility: HOSPITAL | Age: 45
Discharge: HOME | End: 2025-03-11
Attending: EMERGENCY MEDICINE
Payer: MEDICAID

## 2025-03-11 VITALS
HEART RATE: 84 BPM | RESPIRATION RATE: 16 BRPM | DIASTOLIC BLOOD PRESSURE: 89 MMHG | SYSTOLIC BLOOD PRESSURE: 125 MMHG | WEIGHT: 180 LBS | OXYGEN SATURATION: 95 % | HEIGHT: 64 IN | BODY MASS INDEX: 30.73 KG/M2 | TEMPERATURE: 99 F

## 2025-03-11 DIAGNOSIS — J40 BRONCHITIS: ICD-10-CM

## 2025-03-11 DIAGNOSIS — M13.0 POLYARTHRITIS: Primary | ICD-10-CM

## 2025-03-11 LAB
ALBUMIN SERPL-MCNC: 4.8 G/DL (ref 3.2–4.8)
ALBUMIN/GLOB SERPL: 1.7 {RATIO} (ref 1–2)
ALP LIVER SERPL-CCNC: 105 U/L
ALT SERPL-CCNC: 45 U/L
ANION GAP SERPL CALC-SCNC: 6 MMOL/L (ref 0–18)
AST SERPL-CCNC: 33 U/L (ref ?–34)
BASOPHILS # BLD AUTO: 0.05 X10(3) UL (ref 0–0.2)
BASOPHILS NFR BLD AUTO: 0.6 %
BILIRUB SERPL-MCNC: <0.2 MG/DL (ref 0.3–1.2)
BUN BLD-MCNC: 17 MG/DL (ref 9–23)
CALCIUM BLD-MCNC: 9.7 MG/DL (ref 8.7–10.6)
CHLORIDE SERPL-SCNC: 105 MMOL/L (ref 98–112)
CO2 SERPL-SCNC: 28 MMOL/L (ref 21–32)
CREAT BLD-MCNC: 1.07 MG/DL
CRP SERPL-MCNC: <0.4 MG/DL (ref ?–0.5)
D DIMER PPP FEU-MCNC: <0.27 UG/ML FEU (ref ?–0.5)
EGFRCR SERPLBLD CKD-EPI 2021: 65 ML/MIN/1.73M2 (ref 60–?)
EOSINOPHIL # BLD AUTO: 0.11 X10(3) UL (ref 0–0.7)
EOSINOPHIL NFR BLD AUTO: 1.3 %
ERYTHROCYTE [DISTWIDTH] IN BLOOD BY AUTOMATED COUNT: 15.1 %
ERYTHROCYTE [SEDIMENTATION RATE] IN BLOOD: 20 MM/HR
FLUAV + FLUBV RNA SPEC NAA+PROBE: NEGATIVE
FLUAV + FLUBV RNA SPEC NAA+PROBE: NEGATIVE
GLOBULIN PLAS-MCNC: 2.8 G/DL (ref 2–3.5)
GLUCOSE BLD-MCNC: 92 MG/DL (ref 70–99)
HCT VFR BLD AUTO: 40 %
HETEROPH AB SER QL: NEGATIVE
HGB BLD-MCNC: 13.2 G/DL
IMM GRANULOCYTES # BLD AUTO: 0.02 X10(3) UL (ref 0–1)
IMM GRANULOCYTES NFR BLD: 0.2 %
LYMPHOCYTES # BLD AUTO: 3.18 X10(3) UL (ref 1–4)
LYMPHOCYTES NFR BLD AUTO: 37.7 %
MCH RBC QN AUTO: 26.7 PG (ref 26–34)
MCHC RBC AUTO-ENTMCNC: 33 G/DL (ref 31–37)
MCV RBC AUTO: 80.8 FL
MONOCYTES # BLD AUTO: 0.7 X10(3) UL (ref 0.1–1)
MONOCYTES NFR BLD AUTO: 8.3 %
NEUTROPHILS # BLD AUTO: 4.38 X10 (3) UL (ref 1.5–7.7)
NEUTROPHILS # BLD AUTO: 4.38 X10(3) UL (ref 1.5–7.7)
NEUTROPHILS NFR BLD AUTO: 51.9 %
OSMOLALITY SERPL CALC.SUM OF ELEC: 289 MOSM/KG (ref 275–295)
PLATELET # BLD AUTO: 388 10(3)UL (ref 150–450)
POTASSIUM SERPL-SCNC: 4.6 MMOL/L (ref 3.5–5.1)
PROT SERPL-MCNC: 7.6 G/DL (ref 5.7–8.2)
RBC # BLD AUTO: 4.95 X10(6)UL
RSV RNA SPEC NAA+PROBE: NEGATIVE
SARS-COV-2 RNA RESP QL NAA+PROBE: NOT DETECTED
SODIUM SERPL-SCNC: 139 MMOL/L (ref 136–145)
TROPONIN I SERPL HS-MCNC: <3 NG/L
WBC # BLD AUTO: 8.4 X10(3) UL (ref 4–11)

## 2025-03-11 PROCEDURE — 96374 THER/PROPH/DIAG INJ IV PUSH: CPT

## 2025-03-11 PROCEDURE — 0241U SARS-COV-2/FLU A AND B/RSV BY PCR (GENEXPERT): CPT | Performed by: EMERGENCY MEDICINE

## 2025-03-11 PROCEDURE — 86403 PARTICLE AGGLUT ANTBDY SCRN: CPT | Performed by: EMERGENCY MEDICINE

## 2025-03-11 PROCEDURE — 86664 EPSTEIN-BARR NUCLEAR ANTIGEN: CPT | Performed by: EMERGENCY MEDICINE

## 2025-03-11 PROCEDURE — 86665 EPSTEIN-BARR CAPSID VCA: CPT | Performed by: EMERGENCY MEDICINE

## 2025-03-11 PROCEDURE — 99285 EMERGENCY DEPT VISIT HI MDM: CPT

## 2025-03-11 PROCEDURE — 85652 RBC SED RATE AUTOMATED: CPT | Performed by: EMERGENCY MEDICINE

## 2025-03-11 PROCEDURE — 71045 X-RAY EXAM CHEST 1 VIEW: CPT | Performed by: EMERGENCY MEDICINE

## 2025-03-11 PROCEDURE — 85379 FIBRIN DEGRADATION QUANT: CPT | Performed by: EMERGENCY MEDICINE

## 2025-03-11 PROCEDURE — 84484 ASSAY OF TROPONIN QUANT: CPT | Performed by: EMERGENCY MEDICINE

## 2025-03-11 PROCEDURE — 80053 COMPREHEN METABOLIC PANEL: CPT | Performed by: EMERGENCY MEDICINE

## 2025-03-11 PROCEDURE — 93005 ELECTROCARDIOGRAM TRACING: CPT

## 2025-03-11 PROCEDURE — 85025 COMPLETE CBC W/AUTO DIFF WBC: CPT | Performed by: EMERGENCY MEDICINE

## 2025-03-11 PROCEDURE — 86140 C-REACTIVE PROTEIN: CPT | Performed by: EMERGENCY MEDICINE

## 2025-03-11 PROCEDURE — 87430 STREP A AG IA: CPT | Performed by: EMERGENCY MEDICINE

## 2025-03-11 PROCEDURE — 93010 ELECTROCARDIOGRAM REPORT: CPT

## 2025-03-11 RX ORDER — NAPROXEN 500 MG/1
500 TABLET ORAL 2 TIMES DAILY PRN
Qty: 20 TABLET | Refills: 0 | Status: SHIPPED | OUTPATIENT
Start: 2025-03-11 | End: 2025-03-21

## 2025-03-11 RX ORDER — DICYCLOMINE HYDROCHLORIDE 10 MG/1
10 CAPSULE ORAL
COMMUNITY

## 2025-03-11 RX ORDER — KETOROLAC TROMETHAMINE 15 MG/ML
15 INJECTION, SOLUTION INTRAMUSCULAR; INTRAVENOUS ONCE
Status: COMPLETED | OUTPATIENT
Start: 2025-03-11 | End: 2025-03-11

## 2025-03-11 RX ORDER — BENZONATATE 200 MG/1
200 CAPSULE ORAL 3 TIMES DAILY PRN
Qty: 30 CAPSULE | Refills: 0 | Status: SHIPPED | OUTPATIENT
Start: 2025-03-11 | End: 2025-04-10

## 2025-03-11 RX ORDER — PREDNISONE 20 MG/1
TABLET ORAL
Qty: 18 TABLET | Refills: 0 | Status: SHIPPED | OUTPATIENT
Start: 2025-03-11 | End: 2025-03-20

## 2025-03-11 RX ORDER — ALBUTEROL SULFATE 90 UG/1
2 INHALANT RESPIRATORY (INHALATION) EVERY 4 HOURS PRN
Qty: 1 EACH | Refills: 0 | Status: SHIPPED | OUTPATIENT
Start: 2025-03-11 | End: 2025-04-10

## 2025-03-11 NOTE — ED INITIAL ASSESSMENT (HPI)
Arrives via EMS from work c/o joint pain that started today. Endorses pain to knees, ankles, toes. Reports she had a fever last week. States she has had a productive cough.     Was given Zofran IV by EMS due to nausea from coughing so much.

## 2025-03-11 NOTE — ED QUICK NOTES
Rounding Completed    Plan of Care reviewed. Waiting for results/provider re-eval.  Elimination needs assessed. Reports pain meds did not relieve her pain. This RN notified Antonio VILLA in person    Bed is locked and in lowest position. Call light within reach.

## 2025-03-11 NOTE — ED PROVIDER NOTES
Patient Seen in: [facility] Emergency Department      History     Chief Complaint   Patient presents with    Joint Pain    Nausea     Stated Complaint: Joint Pain    Subjective:   HPI      44-year-old female with a past medical history as below including migraines, GERD, anxiety brought by EMS for evaluation of joint pain swelling along with cough and cold symptoms.  Patient states cough and cold symptoms started almost a month ago.  She states cough has been productive with occasional blood in the sputum.  She reports chest pain with coughing and reports feeling short of breath at times.  She also reports generalized weakness, fatigue.  She reports sore throat and swollen glands in her neck.  She has been running low-grade fevers.  She noticed the rash on her arms and legs today as not itchy.  She states joint pain started about 4 days ago pain in her hips, knees and ankles.      Objective:     Past Medical History:    Anxiety    Attention deficit disorder with hyperactivity(314.01)    Carnitine deficiency (HCC)    Esophageal reflux    Estrogen deficiency    Foot fracture         Generalized weakness    Iron deficiency anemia    Migraine, unspecified, without mention of intractable migraine without mention of status migrainosus    Migraines    Pain in joint, pelvic region and thigh    Personal history of methicillin resistant Staphylococcus aureus    Unspecified essential hypertension              Past Surgical History:   Procedure Laterality Date          , 2005    Hysterectomy  2009    Other surgical history  1998    Gallbladder     Other surgical history  1996    Nasal septal deviation repair    Plastic surgery - external N/A 2023    \"mommy makeover\"    T&a  1996                Social History     Socioeconomic History    Marital status: Unknown   Tobacco Use    Smoking status: Former     Passive exposure: Current    Smokeless tobacco: Never   Vaping Use     Vaping status: Some Days   Substance and Sexual Activity    Alcohol use: No    Drug use: Yes     Types: Cannabis   Other Topics Concern    Caffeine Concern Yes     Comment: 0-6 oz daily    Exercise Yes     Comment: Yoga daily, cardio & Wts 7 times/week                  Physical Exam     ED Triage Vitals [03/11/25 1355]   BP (!) 164/106   Pulse 103   Resp 16   Temp 99 °F (37.2 °C)   Temp src Temporal   SpO2 100 %   O2 Device None (Room air)       Current Vitals:   Vital Signs  BP: 126/90  Pulse: 89  Resp: 19  Temp: 99 °F (37.2 °C)  Temp src: Temporal  MAP (mmHg): (!) 102    Oxygen Therapy  SpO2: 95 %  O2 Device: None (Room air)        Physical Exam  Vitals and nursing note reviewed.   Constitutional:       Appearance: She is well-developed.   HENT:      Head: Normocephalic and atraumatic.      Mouth/Throat:      Mouth: Mucous membranes are moist.   Eyes:      General: No scleral icterus.  Cardiovascular:      Rate and Rhythm: Normal rate and regular rhythm.   Pulmonary:      Effort: Pulmonary effort is normal. No respiratory distress.      Breath sounds: Normal breath sounds. No wheezing.   Abdominal:      Palpations: Abdomen is soft.      Tenderness: There is no abdominal tenderness.   Skin:     General: Skin is warm and dry.      Comments: Macular papular rash mainly on legs and arms on the dorsal aspect   Neurological:      General: No focal deficit present.      Mental Status: She is alert and oriented to person, place, and time.      Cranial Nerves: No cranial nerve deficit.      Motor: No weakness.   Psychiatric:         Mood and Affect: Mood normal.         Behavior: Behavior normal.             ED Course     Labs Reviewed   COMP METABOLIC PANEL (14) - Abnormal; Notable for the following components:       Result Value    Creatinine 1.07 (*)     Alkaline Phosphatase 105 (*)     Bilirubin, Total <0.2 (*)     All other components within normal limits   C-REACTIVE PROTEIN - Normal   MONO QUAL, RFX TO EBV-VCA ON  NEG - Normal   D-DIMER - Normal   TROPONIN I HIGH SENSITIVITY - Normal   SARS-COV-2/FLU A AND B/RSV BY PCR (GENEXPERT) - Normal    Narrative:     This test is intended for the qualitative detection and differentiation of SARS-CoV-2, influenza A, influenza B, and respiratory syncytial virus (RSV) viral RNA in nasopharyngeal or nares swabs from individuals suspected of respiratory viral infection consistent with COVID-19 by their healthcare provider. Signs and symptoms of respiratory viral infection due to SARS-CoV-2, influenza, and RSV can be similar.    Test performed using the Xpert Xpress SARS-CoV-2/FLU/RSV (real time RT-PCR)  assay on the GeneXpert instrument, [employer], [place] [address].   This test is being used under the Food and Drug Administration's Emergency Use Authorization.    The authorized Fact Sheet for Healthcare Providers for this assay is available upon request from the laboratory.   RAPID STREP A SCREEN (LC) - Normal    Narrative:     A confirmatory culture is recommended if clinically indicated.   CBC WITH DIFFERENTIAL WITH PLATELET   SED RATE, WESTERGREN (AUTOMATED)   EBV, CHRONIC/ACTIVE INFECTION,IGG/IGM   RAINBOW DRAW LAVENDER   RAINBOW DRAW LIGHT GREEN   RAINBOW DRAW BLUE   RAINBOW DRAW GOLD     EKG    Rate, intervals and axes as noted on EKG Report.  Rate: 89  Rhythm: Sinus Rhythm  Reading: Normal sinus rhythm, no ST/T wave changes                XR CHEST AP PORTABLE  (CPT=71045)    Result Date: 3/11/2025  CONCLUSION:  There is no evidence of active cardiopulmonary disease on this single portable chest radiograph.   LOCATION:  [facility]      Dictated by (CST): Michael Arguello MD on 3/11/2025 at 3:05 PM     Finalized by (CST): Michael Arguello MD on 3/11/2025 at 3:06 PM             MDM      44-year-old female with a past medical history as below including migraines, GERD, anxiety brought by EMS for evaluation of joint pain swelling along with cough and cold symptoms.      Differential includes but  is not limited to viral syndrome, streptococcal pharyngitis, streptococcal arthritis, mononucleosis, pneumonia, autoimmune disorder    Extensive lab workup is reassuring.  WBC and hemoglobin is normal.  Electrolytes are normal.  Patient has minimal CKD with creatinine 1.07, similar to previous on 9/11/2024.  High sensitive troponin and D-dimer are undetectable.  ESR and CRP are normal.    Independent interpretation of chest x-ray shows no evidence of pneumonia.  Radiology report reviewed as above noting no active cardiopulmonary disease.    Patient states symptoms started over a month ago with cough and cold symptoms likely related to postviral syndrome.  Will treat for bronchitis with steroids which should also help with polyarthralgia.  Instructed to follow-up with PCP and rheumatology for further outpatient workup and management.  Will also give Rx for naproxen.  Return precaution discussed.        Medical Decision Making  Amount and/or Complexity of Data Reviewed  Labs: ordered. Decision-making details documented in ED Course.  Radiology: ordered and independent interpretation performed. Decision-making details documented in ED Course.  ECG/medicine tests: ordered and independent interpretation performed. Decision-making details documented in ED Course.    Risk  Prescription drug management.        Disposition and Plan     Clinical Impression:  1. Polyarthritis    2. Bronchitis         Disposition:  Discharge  3/11/2025  4:39 pm    Follow-up:  Ofe Cooper,   [address]  [address]  [facility] [address]  [phone]    Follow up      Jenni eBdoya DO  [address]  [address]  [facility] [address]  [phone]    Schedule an appointment as soon as possible for a visit            Medications Prescribed:  Current Discharge Medication List        START taking these medications    Details   predniSONE 20 MG Oral Tab Take 3 tablets (60 mg total) by mouth daily for 3 days, THEN 2 tablets (40 mg total)  daily for 3 days, THEN 1 tablet (20 mg total) daily for 3 days.  Qty: 18 tablet, Refills: 0      benzonatate 200 MG Oral Cap Take 1 capsule (200 mg total) by mouth 3 (three) times daily as needed for cough.  Qty: 30 capsule, Refills: 0      naproxen 500 MG Oral Tab Take 1 tablet (500 mg total) by mouth 2 (two) times daily as needed.  Qty: 20 tablet, Refills: 0      albuterol 108 (90 Base) MCG/ACT Inhalation Aero Soln Inhale 2 puffs into the lungs every 4 (four) hours as needed for Wheezing.  Qty: 1 each, Refills: 0                 Supplementary Documentation:

## 2025-03-11 NOTE — ED QUICK NOTES
Pt cleared for discharge by Antonio VILLA. This RN provided pt with discharge teaching, pt verbalized understanding of information. VS obtained prior to dispo, peripheral line removed. No further questions regarding discharge.

## 2025-03-12 ENCOUNTER — APPOINTMENT (OUTPATIENT)
Dept: CT IMAGING | Age: 45
End: 2025-03-12
Attending: PHYSICIAN ASSISTANT
Payer: MEDICAID

## 2025-03-12 ENCOUNTER — HOSPITAL ENCOUNTER (EMERGENCY)
Age: 45
Discharge: HOME | End: 2025-03-12
Attending: EMERGENCY MEDICINE
Payer: MEDICAID

## 2025-03-12 VITALS
HEART RATE: 106 BPM | DIASTOLIC BLOOD PRESSURE: 109 MMHG | BODY MASS INDEX: 31 KG/M2 | SYSTOLIC BLOOD PRESSURE: 164 MMHG | TEMPERATURE: 98 F | RESPIRATION RATE: 17 BRPM | WEIGHT: 180 LBS | OXYGEN SATURATION: 98 %

## 2025-03-12 DIAGNOSIS — R51.9 ACUTE NONINTRACTABLE HEADACHE, UNSPECIFIED HEADACHE TYPE: Primary | ICD-10-CM

## 2025-03-12 LAB
ANION GAP SERPL CALC-SCNC: 9 MMOL/L (ref 0–18)
ATRIAL RATE: 89 BPM
BASOPHILS # BLD AUTO: 0.06 X10(3) UL (ref 0–0.2)
BASOPHILS NFR BLD AUTO: 0.6 %
BUN BLD-MCNC: 11 MG/DL (ref 9–23)
CALCIUM BLD-MCNC: 9.4 MG/DL (ref 8.7–10.6)
CHLORIDE SERPL-SCNC: 104 MMOL/L (ref 98–112)
CO2 SERPL-SCNC: 25 MMOL/L (ref 21–32)
CREAT BLD-MCNC: 0.88 MG/DL
EBV NA IGG SER QL IA: POSITIVE
EBV VCA IGG SER QL IA: POSITIVE
EBV VCA IGM SER QL IA: NEGATIVE
EGFRCR SERPLBLD CKD-EPI 2021: 83 ML/MIN/1.73M2 (ref 60–?)
EOSINOPHIL # BLD AUTO: 0.13 X10(3) UL (ref 0–0.7)
EOSINOPHIL NFR BLD AUTO: 1.3 %
ERYTHROCYTE [DISTWIDTH] IN BLOOD BY AUTOMATED COUNT: 14.9 %
GLUCOSE BLD-MCNC: 118 MG/DL (ref 70–99)
HCT VFR BLD AUTO: 37.4 %
HGB BLD-MCNC: 12.4 G/DL
IMM GRANULOCYTES # BLD AUTO: 0.02 X10(3) UL (ref 0–1)
IMM GRANULOCYTES NFR BLD: 0.2 %
LYMPHOCYTES # BLD AUTO: 3.06 X10(3) UL (ref 1–4)
LYMPHOCYTES NFR BLD AUTO: 30.2 %
MCH RBC QN AUTO: 27.1 PG (ref 26–34)
MCHC RBC AUTO-ENTMCNC: 33.2 G/DL (ref 31–37)
MCV RBC AUTO: 81.7 FL
MONOCYTES # BLD AUTO: 0.84 X10(3) UL (ref 0.1–1)
MONOCYTES NFR BLD AUTO: 8.3 %
NEUTROPHILS # BLD AUTO: 6.01 X10 (3) UL (ref 1.5–7.7)
NEUTROPHILS # BLD AUTO: 6.01 X10(3) UL (ref 1.5–7.7)
NEUTROPHILS NFR BLD AUTO: 59.4 %
OSMOLALITY SERPL CALC.SUM OF ELEC: 286 MOSM/KG (ref 275–295)
P AXIS: 54 DEGREES
P-R INTERVAL: 130 MS
PLATELET # BLD AUTO: 376 10(3)UL (ref 150–450)
POTASSIUM SERPL-SCNC: 3.7 MMOL/L (ref 3.5–5.1)
Q-T INTERVAL: 386 MS
QRS DURATION: 78 MS
QTC CALCULATION (BEZET): 469 MS
R AXIS: 45 DEGREES
RBC # BLD AUTO: 4.58 X10(6)UL
SODIUM SERPL-SCNC: 138 MMOL/L (ref 136–145)
T AXIS: 51 DEGREES
VENTRICULAR RATE: 89 BPM
WBC # BLD AUTO: 10.1 X10(3) UL (ref 4–11)

## 2025-03-12 PROCEDURE — 85025 COMPLETE CBC W/AUTO DIFF WBC: CPT | Performed by: PHYSICIAN ASSISTANT

## 2025-03-12 PROCEDURE — 99284 EMERGENCY DEPT VISIT MOD MDM: CPT

## 2025-03-12 PROCEDURE — 96375 TX/PRO/DX INJ NEW DRUG ADDON: CPT

## 2025-03-12 PROCEDURE — 96361 HYDRATE IV INFUSION ADD-ON: CPT

## 2025-03-12 PROCEDURE — 96374 THER/PROPH/DIAG INJ IV PUSH: CPT

## 2025-03-12 PROCEDURE — 80048 BASIC METABOLIC PNL TOTAL CA: CPT | Performed by: PHYSICIAN ASSISTANT

## 2025-03-12 RX ORDER — METOCLOPRAMIDE HYDROCHLORIDE 5 MG/ML
10 INJECTION INTRAMUSCULAR; INTRAVENOUS ONCE
Status: COMPLETED | OUTPATIENT
Start: 2025-03-12 | End: 2025-03-12

## 2025-03-12 RX ORDER — DIPHENHYDRAMINE HYDROCHLORIDE 50 MG/ML
25 INJECTION INTRAMUSCULAR; INTRAVENOUS ONCE
Status: COMPLETED | OUTPATIENT
Start: 2025-03-12 | End: 2025-03-12

## 2025-03-12 NOTE — ED PROVIDER NOTES
Patient Seen in: [facility] In [place]      History     Chief Complaint   Patient presents with    Headache     Stated Complaint: migraine, bloody nose    Subjective:   HPI    44-year-old female with past medical history of migraines, acid reflux, anxiety who presents to the ER for headache That started yesterday.  Reports that headache is right sided and radiates into her right neck and associated with nausea, photophobia.  States she has having some dizziness but cannot tell if this is new.  She tried taking sumatriptan and Excedrin Migraine with no relief of her symptoms.  Denies any new vision changes, numbness or tingling.  Denies any recent head injury, neck injury, chiropractic adjustments.  States that headache is worse with coughing.    Patient was seen in the emergency department yesterday for symptoms of joint pain, leg swelling, cough ongoing for the past 48 days.  Reports last fever was yesterday to 101.  She had workup completed yesterday which was unremarkable and she was discharged home.  Reports that she did have the headache yesterday while in the ER but it seemed like it had improved during stay.    Objective:     No pertinent past medical history.            No pertinent past surgical history.              Social History     Socioeconomic History    Marital status: Unknown   Tobacco Use    Smoking status: Former     Passive exposure: Current    Smokeless tobacco: Never   Vaping Use    Vaping status: Some Days   Substance and Sexual Activity    Alcohol use: No    Drug use: Yes     Types: Cannabis   Other Topics Concern    Caffeine Concern Yes     Comment: 0-6 oz daily    Exercise Yes     Comment: Yoga daily, cardio & Wts 7 times/week                  Physical Exam     ED Triage Vitals   BP 03/12/25 1318 (!) 189/111   Pulse 03/12/25 1317 106   Resp 03/12/25 1317 20   Temp 03/12/25 1318 98 °F (36.7 °C)   Temp src 03/12/25 1318 Oral   SpO2 03/12/25 1317 97 %   O2 Device 03/12/25  1317 None (Room air)       Current Vitals:   Vital Signs  BP: (!) 164/109  Pulse: 106  Resp: 17  Temp: 98 °F (36.7 °C)  Temp src: Oral    Oxygen Therapy  SpO2: 98 %  O2 Device: None (Room air)        Physical Exam  GENERAL APPEARANCE:  AxOx4, generally well-appearing, no acute distress.  HEENT:  NC, AT. MMM. EOMI, clear conjunctiva, oropharynx clear.  Clear TMs bilaterally.  No mastoid tenderness.  NECK: Reproducible cervical paraspinal right sided neck tenderness.  Supple without lymphadenopathy.  No stiffness or restricted ROM.  HEART:  Normal rate and regular rhythm, normal S1/S1, no m/r/g  LUNGS:  CTAB, moving air well. No crackles or wheezes are heard.  ABDOMEN:  Soft, nontender, nondistended with good bowel sounds heard.  BACK: No CVAT, no obvious deformity.  EXTREMITIES:  Without cyanosis, clubbing or edema.  NEUROLOGICAL: Cranial nerves II through XII intact, PERRLA and EOMI.  Normal cerebellar function.  5/5 strength and normal sensation in all extremities.  Normal gait on my assessment.   Skin:  Warm and dry without any rash.        ED Course     Labs Reviewed   BASIC METABOLIC PANEL (8) - Abnormal; Notable for the following components:       Result Value    Glucose 118 (*)     All other components within normal limits   CBC WITH DIFFERENTIAL WITH PLATELET               MDM      44-year-old female who presents to the ER for headache that started yesterday.  Vitals with elevated blood pressure and heart rate to 106 but otherwise within normal limits.  Patient states she did take her amlodipine this morning.  Differential diagnosis includes but does not exclude migraine versus dissection versus meningitis versus musculoskeletal.  Plan for labs, pain control    Started pain medications here. Labs including CBC and BMP with no acute findings.  Notified by tech that patient is asking to leave.  Went to go talk with patient she states that her pain is better and she would like to go home.  Discussed plan for CT  imaging to assess for other life-threatening diagnoses such as dissection and aneurysm.  Patient declined, states she would still like to go home and understands the risks of not getting this test.  She was also seen and evaluated by Dr. Alvarez.  Ready for discharge.        Medical Decision Making      Disposition and Plan     Clinical Impression:  1. Acute nonintractable headache, unspecified headache type         Disposition:  Discharge  3/12/2025  2:41 pm    Follow-up:  No follow-up provider specified.        Medications Prescribed:  Discharge Medication List as of 3/12/2025  2:43 PM              Supplementary Documentation:

## 2025-03-12 NOTE — DISCHARGE INSTRUCTIONS
Continue to take Tylenol or Excedrin at home.  Make an appointment for follow-up with your primary care doctor.  Return to the ER for any other new or worsening symptoms.

## 2025-03-12 NOTE — ED INITIAL ASSESSMENT (HPI)
Pt was taken by ambulance to [facility] ED from work for a migraine yesterday woke up this morning having the same migraine.

## 2025-03-27 ENCOUNTER — TELEPHONE (OUTPATIENT)
Age: 45
End: 2025-03-27

## 2025-03-27 NOTE — TELEPHONE ENCOUNTER
New Consult for Hien Barr Virus AB IgG, Referred by   Jayant Ansari MD fro [facility] to see Dr. Orozco

## 2025-04-13 NOTE — ED INITIAL ASSESSMENT (HPI)
Patient reports being seen yesterday for rectal bleeding - today worse, now with pain   Also reports still has a migraine   
6 (moderate pain)

## 2025-04-30 ENCOUNTER — HOSPITAL ENCOUNTER (EMERGENCY)
Age: 45
Discharge: HOME | End: 2025-04-30
Attending: STUDENT IN AN ORGANIZED HEALTH CARE EDUCATION/TRAINING PROGRAM
Payer: MEDICAID

## 2025-04-30 VITALS
RESPIRATION RATE: 20 BRPM | WEIGHT: 180 LBS | OXYGEN SATURATION: 100 % | HEIGHT: 64 IN | SYSTOLIC BLOOD PRESSURE: 150 MMHG | TEMPERATURE: 98 F | DIASTOLIC BLOOD PRESSURE: 99 MMHG | BODY MASS INDEX: 30.73 KG/M2 | HEART RATE: 90 BPM

## 2025-04-30 DIAGNOSIS — M54.16 LUMBAR BACK PAIN WITH RADICULOPATHY AFFECTING LOWER EXTREMITY: Primary | ICD-10-CM

## 2025-04-30 PROCEDURE — 99283 EMERGENCY DEPT VISIT LOW MDM: CPT

## 2025-04-30 PROCEDURE — 96372 THER/PROPH/DIAG INJ SC/IM: CPT

## 2025-04-30 PROCEDURE — 99284 EMERGENCY DEPT VISIT MOD MDM: CPT

## 2025-04-30 RX ORDER — KETOROLAC TROMETHAMINE 10 MG/1
10 TABLET, FILM COATED ORAL EVERY 6 HOURS PRN
Qty: 20 TABLET | Refills: 0 | Status: SHIPPED | OUTPATIENT
Start: 2025-04-30 | End: 2025-05-05

## 2025-04-30 RX ORDER — MELATONIN
1000 DAILY
COMMUNITY
Start: 2024-08-27

## 2025-04-30 RX ORDER — LIDOCAINE 50 MG/G
1 PATCH TOPICAL EVERY 24 HOURS
Qty: 30 EACH | Refills: 0 | Status: SHIPPED | OUTPATIENT
Start: 2025-04-30

## 2025-04-30 RX ORDER — KETOROLAC TROMETHAMINE 15 MG/ML
15 INJECTION, SOLUTION INTRAMUSCULAR; INTRAVENOUS ONCE
Status: COMPLETED | OUTPATIENT
Start: 2025-04-30 | End: 2025-04-30

## 2025-04-30 RX ORDER — METHOCARBAMOL 750 MG/1
750 TABLET, FILM COATED ORAL 4 TIMES DAILY
Qty: 40 TABLET | Refills: 0 | Status: SHIPPED | OUTPATIENT
Start: 2025-04-30

## 2025-04-30 RX ORDER — GABAPENTIN 100 MG/1
CAPSULE ORAL
Qty: 243 CAPSULE | Refills: 0 | Status: SHIPPED | OUTPATIENT
Start: 2025-04-30 | End: 2025-05-30

## 2025-04-30 RX ORDER — HYDROCODONE BITARTRATE AND ACETAMINOPHEN 5; 325 MG/1; MG/1
1 TABLET ORAL ONCE
Refills: 0 | Status: COMPLETED | OUTPATIENT
Start: 2025-04-30 | End: 2025-04-30

## 2025-04-30 NOTE — ED INITIAL ASSESSMENT (HPI)
PT to the ED for evaluation of low back pain that radiates into the L hip, L buttock and down the L leg. PT reports the pain started a few days ago, but is getting worse. PT took an Excedrin and half a Klonopin 2 hours PTA, with no relief.

## 2025-04-30 NOTE — ED PROVIDER NOTES
Patient Seen in: [facility] In [place]      History     Chief Complaint   Patient presents with    Back Pain     Stated Complaint: low back and L hip pain for a few days. Klonipin and excedrin taken 2 hours PTA    Subjective:   HPI    Patient is a 44-year-old female presenting to emergency department for evaluation of lower back pain with radiation to both legs.  Pain began 3 days ago and has been worsening with radiation to both hips especially the left one and down the back of her legs.  Patient has had an ongoing problem with her lower back for quite some time.  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  Pain is worsened with movement.    History of Present Illness               Objective:     Past Medical History:    Anxiety    Attention deficit disorder with hyperactivity(314.01)    Carnitine deficiency (HCC)    Esophageal reflux    Estrogen deficiency    Foot fracture         Generalized weakness    Iron deficiency anemia    Migraine, unspecified, without mention of intractable migraine without mention of status migrainosus    Migraines    Pain in joint, pelvic region and thigh    Personal history of methicillin resistant Staphylococcus aureus    Unspecified essential hypertension              Past Surgical History:   Procedure Laterality Date          ,     Hysterectomy  2009    Other surgical history  1998    Gallbladder     Other surgical history  1996    Nasal septal deviation repair    Plastic surgery - external N/A 2023    \"mommy makeover\"    T&a  1996                Social History     Socioeconomic History    Marital status: Unknown   Tobacco Use    Smoking status: Former     Passive exposure: Current    Smokeless tobacco: Never   Vaping Use    Vaping status: Some Days   Substance and Sexual Activity    Alcohol use: Yes     Comment: social    Drug use: Yes      Types: Cannabis   Other Topics Concern    Caffeine Concern Yes     Comment: 0-6 oz daily    Exercise Yes     Comment: Yoga daily, cardio & Wts 7 times/week                                Physical Exam     ED Triage Vitals [04/30/25 0122]   BP (!) 157/130   Pulse 97   Resp 20   Temp 97.9 °F (36.6 °C)   Temp src Temporal   SpO2 100 %   O2 Device None (Room air)       Current Vitals:   Vital Signs  BP: (!) 157/130  Pulse: 97  Resp: 20  Temp: 97.9 °F (36.6 °C)  Temp src: Temporal    Oxygen Therapy  SpO2: 100 %  O2 Device: None (Room air)        Physical Exam    Constitutional: No apparent distress  Eyes: No scleral icterus  Respiratory: Chest wall moving symmetrical respirations  Back: Diffuse tenderness along the lumbar paraspinal musculature with associated muscle spasm.  No midline tenderness, no redness.  Skin: No rash  Neuro: Alert and oriented ×3, sensation and strength is intact throughout.  Patient able to ambulate without difficulty and able to lift legs onto the stretcher without any signs of weakness.    Physical Exam                ED Course   Labs Reviewed - No data to display       Results                          MDM      Based on patient's history and physical examination etiology of her back pain appears to be musculoskeletal in nature.  I do not suspect cauda equina or epidural abscess in this patient.    Patient treated with pain medication and muscle relaxants.    Patient demonstrated understanding, they are comfortable with the plan and will follow-up as directed.        Disposition and Plan     Clinical Impression:  1. Lumbar back pain with radiculopathy affecting lower extremity         Disposition:  Discharge  4/30/2025  1:58 am    Follow-up:  [facility], [place] [address]  [facility] [address] [phone]  Schedule an appointment as soon as possible for a visit  For recheck    Sylvie Negrete MD  [address]  [place]  [place] [address]  [phone]    Schedule an appointment as soon as possible for a visit  For recheck          Medications Prescribed:  Current Discharge Medication List        START taking these medications    Details   methocarbamol 750 MG Oral Tab Take 1 tablet (750 mg total) by mouth 4 (four) times daily.  Qty: 40 tablet, Refills: 0      diclofenac 1 % External Gel Apply 4 g topically 4 (four) times daily.  Qty: 150 g, Refills: 0      lidocaine (LIDODERM) 5 % External Patch Place 1 patch onto the skin daily.  Qty: 30 each, Refills: 0      Ketorolac Tromethamine 10 MG Oral Tab Take 1 tablet (10 mg total) by mouth every 6 (six) hours as needed for Pain.  Qty: 20 tablet, Refills: 0      gabapentin 100 MG Oral Cap Take 1 capsule (100 mg total) by mouth 3 (three) times daily for 3 days, THEN 2 capsules (200 mg total) 3 (three) times daily for 3 days, THEN 3 capsules (300 mg total) 3 (three) times daily for 24 days.  Qty: 243 capsule, Refills: 0             Supplementary Documentation:

## 2025-07-17 ENCOUNTER — HOSPITAL ENCOUNTER (EMERGENCY)
Age: 45
Discharge: HOME | End: 2025-07-17
Attending: EMERGENCY MEDICINE
Payer: MEDICAID

## 2025-07-17 VITALS
HEART RATE: 92 BPM | WEIGHT: 180 LBS | TEMPERATURE: 98 F | RESPIRATION RATE: 18 BRPM | OXYGEN SATURATION: 98 % | SYSTOLIC BLOOD PRESSURE: 151 MMHG | HEIGHT: 64 IN | DIASTOLIC BLOOD PRESSURE: 101 MMHG | BODY MASS INDEX: 30.73 KG/M2

## 2025-07-17 DIAGNOSIS — M54.40 BACK PAIN OF LUMBAR REGION WITH SCIATICA: Primary | ICD-10-CM

## 2025-07-17 PROCEDURE — 96374 THER/PROPH/DIAG INJ IV PUSH: CPT

## 2025-07-17 PROCEDURE — 99284 EMERGENCY DEPT VISIT MOD MDM: CPT

## 2025-07-17 PROCEDURE — 96376 TX/PRO/DX INJ SAME DRUG ADON: CPT

## 2025-07-17 PROCEDURE — 96375 TX/PRO/DX INJ NEW DRUG ADDON: CPT

## 2025-07-17 RX ORDER — DEXAMETHASONE SODIUM PHOSPHATE 10 MG/ML
10 INJECTION, SOLUTION INTRAMUSCULAR; INTRAVENOUS ONCE
Status: COMPLETED | OUTPATIENT
Start: 2025-07-17 | End: 2025-07-17

## 2025-07-17 RX ORDER — LIDOCAINE 50 MG/G
1 PATCH TOPICAL EVERY 24 HOURS
Qty: 30 PATCH | Refills: 0 | Status: SHIPPED | OUTPATIENT
Start: 2025-07-17

## 2025-07-17 RX ORDER — METHYLPREDNISOLONE 4 MG/1
TABLET ORAL
Qty: 1 EACH | Refills: 0 | Status: SHIPPED | OUTPATIENT
Start: 2025-07-17

## 2025-07-17 RX ORDER — NAPROXEN 500 MG/1
500 TABLET ORAL 2 TIMES DAILY PRN
Qty: 20 TABLET | Refills: 0 | Status: SHIPPED | OUTPATIENT
Start: 2025-07-17 | End: 2025-07-27

## 2025-07-17 RX ORDER — DIAZEPAM 10 MG/2ML
5 INJECTION, SOLUTION INTRAMUSCULAR; INTRAVENOUS ONCE
Status: COMPLETED | OUTPATIENT
Start: 2025-07-17 | End: 2025-07-17

## 2025-07-17 RX ORDER — MORPHINE SULFATE 4 MG/ML
4 INJECTION, SOLUTION INTRAMUSCULAR; INTRAVENOUS ONCE
Status: COMPLETED | OUTPATIENT
Start: 2025-07-17 | End: 2025-07-17

## 2025-07-17 RX ORDER — HYDROCODONE BITARTRATE AND ACETAMINOPHEN 5; 325 MG/1; MG/1
1 TABLET ORAL EVERY 6 HOURS PRN
Qty: 10 TABLET | Refills: 0 | Status: SHIPPED | OUTPATIENT
Start: 2025-07-17 | End: 2025-07-22

## 2025-07-17 RX ORDER — KETOROLAC TROMETHAMINE 15 MG/ML
15 INJECTION, SOLUTION INTRAMUSCULAR; INTRAVENOUS ONCE
Status: COMPLETED | OUTPATIENT
Start: 2025-07-17 | End: 2025-07-17

## 2025-07-17 NOTE — ED PROVIDER NOTES
Patient Seen in: [facility] In [place]        History  Chief Complaint   Patient presents with    Back Pain     Stated Complaint: lower back pain radiates down both legs    Subjective:   HPI            44-year-old female with a past medical history as below including degenerative disc disease presents with low back pain radiating down her legs.  Patient states she has had similar pain in the past that has been manageable but worsened this week.  Mother states that they were on a long car trip to [place] and thinks that may have exacerbated her symptoms.  She states the pain is in her lumbar spine and goes into both hips and down the side of her left leg and also to her right thigh.  She denies bowel or bladder dysfunction.  Denies saddle anesthesia.  Denies fever or chills.      Objective:     Past Medical History:    Anxiety    Attention deficit disorder with hyperactivity(314.01)    Carnitine deficiency (HCC)    Esophageal reflux    Estrogen deficiency    Foot fracture         Generalized weakness    Iron deficiency anemia    Migraine, unspecified, without mention of intractable migraine without mention of status migrainosus    Migraines    Pain in joint, pelvic region and thigh    Personal history of methicillin resistant Staphylococcus aureus    Unspecified essential hypertension              Past Surgical History:   Procedure Laterality Date          ,     Hysterectomy  2009    Other surgical history  1998    Gallbladder     Other surgical history  1996    Nasal septal deviation repair    Plastic surgery - external N/A 2023    \"mommy makeover\"    T&a  1996                Social History     Socioeconomic History    Marital status:    Tobacco Use    Smoking status: Former     Passive exposure: Current    Smokeless tobacco: Never   Vaping Use    Vaping status: Former   Substance and Sexual Activity    Alcohol use: Yes     Comment:  social    Drug use: Yes     Types: Cannabis   Other Topics Concern    Caffeine Concern Yes     Comment: 0-6 oz daily    Exercise Yes     Comment: Yoga daily, cardio & Wts 7 times/week                                Physical Exam    ED Triage Vitals [07/17/25 1251]   BP (!) 189/115   Pulse 112   Resp 21   Temp 98.4 °F (36.9 °C)   Temp src Temporal   SpO2 98 %   O2 Device None (Room air)       Current Vitals:   Vital Signs  BP: (!) 151/101  Pulse: 92  Resp: 18  Temp: 98.4 °F (36.9 °C)  Temp src: Temporal    Oxygen Therapy  SpO2: 98 %  O2 Device: None (Room air)            Physical Exam  Vitals and nursing note reviewed.   Constitutional:       Appearance: She is well-developed.   HENT:      Head: Normocephalic and atraumatic.      Mouth/Throat:      Mouth: Mucous membranes are moist.   Eyes:      General: No scleral icterus.  Abdominal:      Palpations: Abdomen is soft.      Tenderness: There is no abdominal tenderness.   Musculoskeletal:      Comments: Diffuse midline and paraspinal lumbar tenderness  Negative straight leg raise bilaterally  Motor 5/5 bilateral lower extremities  Sensory intact   Skin:     General: Skin is warm and dry.   Neurological:      General: No focal deficit present.      Mental Status: She is alert and oriented to person, place, and time.      Cranial Nerves: No cranial nerve deficit.      Motor: No weakness.   Psychiatric:         Mood and Affect: Mood is anxious. Affect is tearful.         Behavior: Behavior normal.                 ED Course  Labs Reviewed - No data to display                         MDM     44-year-old female with a past medical history as below including degenerative disc disease presents with low back pain radiating down her legs.    Differential includes but is not limited to degenerative disc disease, lumbar radiculopathy, muscular pain.  Patient has noes symptoms or exam findings concerning for cauda equina syndrome, cord compression or epidural spinal  abscess.    Chart review shows patient was seen for similar symptoms on 4/30/2025 and diagnosed with lumbar back pain with radiculopathy.  Patient was advised to follow-up with spine center but she states her insurance lapsed and was not able to schedule an appointment.    Imaging was considered with x-rays but not indicated for atraumatic back pain    Patient was treated with Toradol, Decadron and morphine with improvement in pain.  Will DC home with Rx for naproxen, Zanaflex and Medrol Dosepak.  Instructed follow-up with spine specialist for further outpatient management.  Return precautions discussed.    Medical Decision Making  Risk  Prescription drug management.  Parenteral controlled substances.        Disposition and Plan     Clinical Impression:  1. Back pain of lumbar region with sciatica         Disposition:  Discharge  7/17/2025  4:38 pm    Follow-up:  [address] [place] [address] [place] [id number] [phone]  Schedule an appointment as soon as possible for a visit            Medications Prescribed:  Current Discharge Medication List        START taking these medications    Details   naproxen 500 MG Oral Tab Take 1 tablet (500 mg total) by mouth 2 (two) times daily as needed.  Qty: 20 tablet, Refills: 0      tiZANidine 4 MG Oral Tab Take 1 tablet (4 mg total) by mouth every 8 (eight) hours as needed.  Qty: 20 tablet, Refills: 0      methylPREDNISolone (MEDROL) 4 MG Oral Tablet Therapy Pack Dosepack: take as directed  Qty: 1 each, Refills: 0      HYDROcodone-acetaminophen 5-325 MG Oral Tab Take 1 tablet by mouth every 6 (six) hours as needed.  Qty: 10 tablet, Refills: 0    Associated Diagnoses: Back pain of lumbar region with sciatica      !! lidocaine 5 % External Patch Place 1 patch onto the skin daily.  Qty: 30 patch, Refills: 0       !! - Potential duplicate medications found. Please discuss with provider.                 Supplementary Documentation:

## 2025-07-17 NOTE — ED QUICK NOTES
Pt states her pain had gone down to about a 3 but when she changed position pain is now up to an 8.

## 2025-08-18 ENCOUNTER — APPOINTMENT (OUTPATIENT)
Dept: CT IMAGING | Age: 45
End: 2025-08-18
Attending: NURSE PRACTITIONER

## 2025-08-18 ENCOUNTER — HOSPITAL ENCOUNTER (EMERGENCY)
Age: 45
Discharge: HOME | End: 2025-08-18

## 2025-08-18 ENCOUNTER — APPOINTMENT (OUTPATIENT)
Dept: GENERAL RADIOLOGY | Age: 45
End: 2025-08-18
Attending: NURSE PRACTITIONER

## 2025-08-18 VITALS
TEMPERATURE: 99 F | HEIGHT: 64 IN | HEART RATE: 94 BPM | RESPIRATION RATE: 18 BRPM | OXYGEN SATURATION: 98 % | DIASTOLIC BLOOD PRESSURE: 96 MMHG | BODY MASS INDEX: 32.1 KG/M2 | SYSTOLIC BLOOD PRESSURE: 166 MMHG | WEIGHT: 188 LBS

## 2025-08-18 DIAGNOSIS — S70.12XA CONTUSION OF LEFT HIP AND THIGH, INITIAL ENCOUNTER: ICD-10-CM

## 2025-08-18 DIAGNOSIS — S70.02XA CONTUSION OF LEFT HIP AND THIGH, INITIAL ENCOUNTER: ICD-10-CM

## 2025-08-18 DIAGNOSIS — S30.0XXA LUMBAR CONTUSION, INITIAL ENCOUNTER: Primary | ICD-10-CM

## 2025-08-18 PROCEDURE — 99284 EMERGENCY DEPT VISIT MOD MDM: CPT

## 2025-08-18 PROCEDURE — 72131 CT LUMBAR SPINE W/O DYE: CPT | Performed by: NURSE PRACTITIONER

## 2025-08-18 PROCEDURE — 96372 THER/PROPH/DIAG INJ SC/IM: CPT

## 2025-08-18 PROCEDURE — 73502 X-RAY EXAM HIP UNI 2-3 VIEWS: CPT | Performed by: NURSE PRACTITIONER

## 2025-08-18 RX ORDER — HYDROCODONE BITARTRATE AND ACETAMINOPHEN 5; 325 MG/1; MG/1
1 TABLET ORAL ONCE
Refills: 0 | Status: COMPLETED | OUTPATIENT
Start: 2025-08-18 | End: 2025-08-18

## 2025-08-18 RX ORDER — TRAMADOL HYDROCHLORIDE 50 MG/1
50 TABLET ORAL EVERY 6 HOURS PRN
Qty: 15 TABLET | Refills: 0 | Status: SHIPPED | OUTPATIENT
Start: 2025-08-18 | End: 2025-08-23

## 2025-08-18 RX ORDER — CYCLOBENZAPRINE HCL 10 MG
10 TABLET ORAL ONCE
Status: COMPLETED | OUTPATIENT
Start: 2025-08-18 | End: 2025-08-18

## 2025-08-18 RX ORDER — ORPHENADRINE CITRATE 100 MG/1
100 TABLET ORAL 2 TIMES DAILY
Qty: 20 EACH | Refills: 0 | Status: SHIPPED | OUTPATIENT
Start: 2025-08-18 | End: 2025-08-28

## 2025-08-18 RX ORDER — KETOROLAC TROMETHAMINE 30 MG/ML
60 INJECTION, SOLUTION INTRAMUSCULAR; INTRAVENOUS ONCE
Status: COMPLETED | OUTPATIENT
Start: 2025-08-18 | End: 2025-08-18

## 2025-08-25 ENCOUNTER — TELEPHONE (OUTPATIENT)
Facility: CLINIC | Age: 45
End: 2025-08-25

## (undated) NOTE — LETTER
Date & Time: 4/30/2025, 2:00 AM  Patient: Naresh Aleman  Encounter Provider(s):    Renetta Oden MD       Occupational restrictions  For: Naresh Aleman    No lifting objects greater than 5 lbs  Normal arm and hand activities  Normal leg activities  No repetitive bending, twisting or lifting anything that weighs more than 5 pounds.  Please allow change in positions as needed.  No wound  Sedentary work only        _____________________________  Physician/[facility] Signature